# Patient Record
Sex: FEMALE | Race: WHITE | NOT HISPANIC OR LATINO | Employment: FULL TIME | URBAN - METROPOLITAN AREA
[De-identification: names, ages, dates, MRNs, and addresses within clinical notes are randomized per-mention and may not be internally consistent; named-entity substitution may affect disease eponyms.]

---

## 2018-07-02 ENCOUNTER — OFFICE VISIT (OUTPATIENT)
Dept: URGENT CARE | Facility: CLINIC | Age: 63
End: 2018-07-02
Payer: COMMERCIAL

## 2018-07-02 VITALS
TEMPERATURE: 97.8 F | WEIGHT: 263 LBS | OXYGEN SATURATION: 94 % | SYSTOLIC BLOOD PRESSURE: 136 MMHG | RESPIRATION RATE: 18 BRPM | BODY MASS INDEX: 42.27 KG/M2 | HEIGHT: 66 IN | DIASTOLIC BLOOD PRESSURE: 82 MMHG | HEART RATE: 74 BPM

## 2018-07-02 DIAGNOSIS — H00.022 HORDEOLUM INTERNUM OF RIGHT LOWER EYELID: ICD-10-CM

## 2018-07-02 PROCEDURE — 99214 OFFICE O/P EST MOD 30 MIN: CPT | Performed by: NURSE PRACTITIONER

## 2018-07-02 RX ORDER — POLYMYXIN B SULFATE AND TRIMETHOPRIM 1; 10000 MG/ML; [USP'U]/ML
1 SOLUTION OPHTHALMIC EVERY 4 HOURS
Qty: 10 ML | Refills: 0 | Status: SHIPPED | OUTPATIENT
Start: 2018-07-02 | End: 2018-07-09

## 2018-07-02 NOTE — PROGRESS NOTES
Chief Complaint   Patient presents with   • Eye Problem     rt eye crusty and red x 4 days        HISTORY OF PRESENT ILLNESS: Patient is a 62 y.o. female who presents to urgent care today with complaints of pain, swelling, and tenderness to right lower eye lid for the past four days. She admits to associated clear drainage and crusting. Denies fever or chills. She wears glasses, not contacts. Denies associated eye pain, changes in vision, double vision, photophobia, or foreign body sensation. She has not tried any medication for symptom relief. She has tried warm and cold compresses. Denies previous history of the same.       Patient Active Problem List    Diagnosis Date Noted   • BMI 40.0-44.9, adult ([facility]) 2016   • Psoriasis 2016       Allergies:Sulfa drugs    Current Outpatient Prescriptions Ordered in [facility]   Medication Sig Dispense Refill   • clobetasol (TEMOVATE) 0.05 % Cream Apply 1 Application to affected area(s) 2 times a day. 1 Tube 2   • NAPROXEN PO Take  by mouth.       No current Epic-ordered facility-administered medications on file.        Past Medical History:   Diagnosis Date   • Blood transfusion without reported diagnosis     in her tonsilectomy at age 8   • Hypertension     in history, thought stressed realted, hx of white coat syndrome   • IBD (inflammatory bowel disease)     maybe mild IBS occasionally       Social History   Substance Use Topics   • Smoking status: Former Smoker     Packs/day: 0.75     Years: 20.00     Quit date: 3/1/2016   • Smokeless tobacco: Never Used   • Alcohol use 1.8 oz/week     3 Shots of liquor per week      Comment: intermittent       Family Status   Relation Status   • Mother    • Father  at age 69   • Brother Alive   • Maternal Grandmother    • Maternal Grandfather    • Paternal Grandmother    • Paternal Grandfather    • Brother Alive     Family History   Problem Relation Age of Onset   • Dementia Mother   "  • Cancer Father      brain Ca   • Heart Attack Brother 45       ROS:  Review of Systems   Constitutional: Negative for fever, chills, weight loss, malaise, and fatigue.   HENT: Negative for ear pain, nosebleeds, congestion, sore throat and neck pain.    Eyes: Positive for right lower lid pain, swelling, erythema. Negative for eye pain, changes in vision, double vision, photophobia, or foreign body sensation.   Neuro: Negative for headache, sensory changes, weakness, seizure, LOC.   Cardiovascular: Negative for chest pain, palpitations, orthopnea and leg swelling.   Respiratory: Negative for cough, sputum production, shortness of breath and wheezing.   Gastrointestinal: Negative for abdominal pain, nausea, vomiting or diarrhea.   Musculoskeletal: Negative for falls, neck pain, back pain, joint pain, myalgias.   Skin: Negative for rash, diaphoresis.     Exam:  Blood pressure 136/82, pulse 74, temperature 36.6 °C (97.8 °F), resp. rate 18, height 1.676 m (5' 6\"), weight 119.3 kg (263 lb), SpO2 94 %.  General: well-nourished, well-developed female in NAD  Head: normocephalic, atraumatic  Eyes: PERRLA, no conjunctival injection, acuity grossly intact. 1cm area of erythema, swelling, and tenderness to right lower lid. Internal hordeolum noted.   Ears: normal shape and symmetry, gross auditory acuity is intact.  Nose: symmetrical without tenderness, no discharge.  Mouth/Throat: reasonable hygiene, no erythema, exudates or tonsillar enlargement.  Neck: no masses, range of motion within normal limits, no tracheal deviation. No obvious thyroid enlargement.   Lymph: no cervical adenopathy. No supraclavicular adenopathy.   Neuro: alert and oriented. Cranial nerves 1-12 grossly intact. No sensory deficit.   Cardiovascular: regular rate and rhythm. No edema.  Pulmonary: no distress. Chest is symmetrical with respiration, no wheezes, crackles, or rhonchi.   Musculoskeletal: no clubbing, appropriate muscle tone, gait is " stable.  Skin: warm, dry, intact, no clubbing, no cyanosis, no rashes.   Psych: appropriate mood, affect, judgement.         Assessment/Plan:  1. Hordeolum internum of right lower eyelid  polymixin-trimethoprim (POLYTRIM) 54982-1.1 UNIT/ML-% Solution           Warm compresses, OTC NSAIDS. Contingent antibiotic prescription given to patient to fill upon meeting criteria of guidelines discussed.   Supportive care, differential diagnoses, and indications for immediate follow-up discussed with patient.   Pathogenesis of diagnosis discussed including typical length and natural progression.   Instructed to return to clinic or nearest emergency department for any change in condition, further concerns, or worsening of symptoms.  Patient states understanding of the plan of care and discharge instructions.  Instructed to make an appointment, for follow up, with an eye specialist and/or her primary care provider.        Please note that this dictation was created using voice recognition software. I have made every reasonable attempt to correct obvious errors, but I expect that there are errors of grammar and possibly content that I did not discover before finalizing the note.      GILLIAN Loaiza.

## 2020-02-20 ENCOUNTER — OFFICE VISIT (OUTPATIENT)
Dept: URGENT CARE | Facility: CLINIC | Age: 65
End: 2020-02-20
Payer: COMMERCIAL

## 2020-02-20 VITALS
BODY MASS INDEX: 40.35 KG/M2 | RESPIRATION RATE: 16 BRPM | HEART RATE: 72 BPM | OXYGEN SATURATION: 95 % | DIASTOLIC BLOOD PRESSURE: 78 MMHG | WEIGHT: 250 LBS | SYSTOLIC BLOOD PRESSURE: 132 MMHG | TEMPERATURE: 97 F

## 2020-02-20 DIAGNOSIS — K52.9 GASTROENTERITIS: ICD-10-CM

## 2020-02-20 DIAGNOSIS — R11.0 NAUSEA: ICD-10-CM

## 2020-02-20 PROCEDURE — 99214 OFFICE O/P EST MOD 30 MIN: CPT | Performed by: NURSE PRACTITIONER

## 2020-02-20 RX ORDER — ONDANSETRON 4 MG/1
4 TABLET, ORALLY DISINTEGRATING ORAL EVERY 8 HOURS PRN
Qty: 20 TAB | Refills: 0 | Status: SHIPPED | OUTPATIENT
Start: 2020-02-20 | End: 2021-10-08

## 2020-02-20 ASSESSMENT — ENCOUNTER SYMPTOMS
SHORTNESS OF BREATH: 0
HEARTBURN: 0
SPUTUM PRODUCTION: 0
HEMOPTYSIS: 0
DOUBLE VISION: 0
CHILLS: 0
FLANK PAIN: 0
PALPITATIONS: 0
DIARRHEA: 0
WEIGHT LOSS: 0
BLURRED VISION: 0
BLOOD IN STOOL: 0
ABDOMINAL PAIN: 0
CONSTIPATION: 0
FEVER: 0
COUGH: 0

## 2020-02-20 NOTE — PROGRESS NOTES
"Subjective:      Magdalena Ascencio is a 64 y.o. female who presents with Emesis (Pt states she started having vomiting after having a sandwich on Saturday and it went on for 24 hours. Pt states that for the last couple of years she has become lactose intolerant and has been getting a lot of upset stomach and would like to get checked out. Pt also requesting a referral to PCP.)            HPI This is a new problem. Pt came in c/o nausea and vomiting since last Monday. Pt states she vomited x1 on Saturday night after dinner, then Sunday night after a sushi dinner she woke up at midnight with her stomach \"rumbling\" and vomited \"all day\" Sunday, pt states vomiting episodes has decreased but still gets nauseated and vomits after eating. Pt is able to tolerated some fluids. Vomit described as undigested food, denies any blood. Pt expressed for the past few year her stomach is more \"sensitive\" and she can not eat dairies anymore. But did not eat any dairy within the past few days.  Treatment tried: none. No other aggravating or alleviating factors.       Review of Systems   Constitutional: Negative for chills, fever, malaise/fatigue and weight loss.   HENT: Negative for ear discharge, ear pain, hearing loss and tinnitus.    Eyes: Negative for blurred vision and double vision.   Respiratory: Negative for cough, hemoptysis, sputum production and shortness of breath.    Cardiovascular: Negative for chest pain, palpitations and leg swelling.   Gastrointestinal: Negative for abdominal pain, blood in stool, constipation, diarrhea and heartburn.   Genitourinary: Negative for dysuria, flank pain, frequency, hematuria and urgency.   Skin: Negative for rash.     Allergies   Allergen Reactions   • Sulfa Drugs Rash     Minor rash     Reviewed past medical, surgical and family history. Reviewed prescription and OTC medications with patient in electronic health record today    Current Outpatient Medications on File Prior to Visit "   Medication Sig Dispense Refill   • clobetasol (TEMOVATE) 0.05 % Cream Apply 1 Application to affected area(s) 2 times a day. 1 Tube 2   • NAPROXEN PO Take  by mouth.       No current facility-administered medications on file prior to visit.             Objective:     /78 (BP Location: Left arm, Patient Position: Sitting, BP Cuff Size: Large adult)   Pulse 72   Temp 36.1 °C (97 °F) (Temporal)   Resp 16   Wt 113.4 kg (250 lb)   SpO2 95%   BMI 40.35 kg/m²      Physical Exam  Constitutional:       General: She is not in acute distress.     Appearance: Normal appearance. She is not ill-appearing.   HENT:      Head: Normocephalic.      Nose: No congestion.      Mouth/Throat:      Mouth: Mucous membranes are moist.      Pharynx: Oropharynx is clear.   Neck:      Musculoskeletal: Normal range of motion and neck supple. No neck rigidity or muscular tenderness.   Cardiovascular:      Rate and Rhythm: Normal rate and regular rhythm.      Pulses: Normal pulses.      Heart sounds: Normal heart sounds.   Pulmonary:      Breath sounds: Normal breath sounds. No wheezing or rales.   Abdominal:      General: There is no distension.      Palpations: There is no mass.      Tenderness: There is no abdominal tenderness. There is no right CVA tenderness, left CVA tenderness, guarding or rebound.   Musculoskeletal:         General: No swelling or tenderness.   Skin:     General: Skin is warm and dry.      Capillary Refill: Capillary refill takes less than 2 seconds.   Neurological:      General: No focal deficit present.      Mental Status: She is alert and oriented to person, place, and time. Mental status is at baseline.   Psychiatric:         Mood and Affect: Mood normal.         Behavior: Behavior normal.         Thought Content: Thought content normal.         Judgment: Judgment normal.                 Assessment/Plan:       1. Nausea  REFERRAL TO FOLLOW-UP WITH PRIMARY CARE    ondansetron (ZOFRAN ODT) 4 MG TABLET  DISPERSIBLE   2. Gastroenteritis  REFERRAL TO FOLLOW-UP WITH PRIMARY CARE     Keep well hydrated with sips of water throughtout the day-if water is not tolerated by itself try placing fruits and/or mixing the water with some juice for better taste  Referral for PCP appoitnment given  Seek medical attention for worsening of symptoms especially if you are not able to tolerate PO or medication is not relieving the nausea  Educated in BRAT type diet for a few days until symptoms resolve.

## 2020-02-24 ENCOUNTER — OFFICE VISIT (OUTPATIENT)
Dept: MEDICAL GROUP | Facility: MEDICAL CENTER | Age: 65
End: 2020-02-24
Payer: COMMERCIAL

## 2020-02-24 VITALS
BODY MASS INDEX: 41.32 KG/M2 | WEIGHT: 248 LBS | OXYGEN SATURATION: 94 % | HEART RATE: 78 BPM | HEIGHT: 65 IN | SYSTOLIC BLOOD PRESSURE: 150 MMHG | TEMPERATURE: 97.8 F | RESPIRATION RATE: 14 BRPM | DIASTOLIC BLOOD PRESSURE: 82 MMHG

## 2020-02-24 DIAGNOSIS — R11.0 NAUSEA: ICD-10-CM

## 2020-02-24 DIAGNOSIS — F12.90 MARIJUANA USE, CONTINUOUS: ICD-10-CM

## 2020-02-24 PROCEDURE — 99214 OFFICE O/P EST MOD 30 MIN: CPT | Performed by: NURSE PRACTITIONER

## 2020-02-24 RX ORDER — PROMETHAZINE HYDROCHLORIDE 25 MG/1
25 TABLET ORAL EVERY 6 HOURS PRN
Qty: 30 TAB | Refills: 0 | Status: SHIPPED | OUTPATIENT
Start: 2020-02-24 | End: 2021-10-08

## 2020-02-24 ASSESSMENT — PATIENT HEALTH QUESTIONNAIRE - PHQ9: CLINICAL INTERPRETATION OF PHQ2 SCORE: 6

## 2020-02-25 NOTE — PROGRESS NOTES
"CC: Nausea (nausea and vomiting x 1 week. Stomach aches. No diarrhea. No fever. Can keep some food down, such as peanut butter, oatmeal, saltines)        HPI:     Magdalena presents today for the following:  Last seen by me in 2016      1. Nausea  Follow-up from an urgent care visit on 2/20 for symptoms of nausea and vomiting that started 7 days ago.  States she \"cannot eat anything without \"throwing it back up\" states that she can sip on fluids but large amounts of water will cause her to gag and throw up.  No associated fever.  No bloody or coffee-ground emesis no associated black or bloody stools.  No associated abdominal pain or cramping.  No associated lower GI symptoms such as diarrhea constipation black or bloody stools.  Sometimes we have a gurgly \"rolling\" sensation in her stomach.  If significant can precipitate gagging.  States in the last 48 hours she was \"okay\" and she was able to eat yesterday (Sunday) an English muffin with jam for breakfast, saltines and peanut butter for dinner, small piece of ham without any nausea or vomiting.  Today she was able to have oatmeal and crackers.  Had a small piece of somebody's tuna sandwich and a potato chip which caused her to be nauseous and gagged again.  Currently is not throwing up food but throwing up spit.    Does not currently use any prescription medications.  No over-the-counter medications.  She is not drinking alcohol \"last alcohol beverage was at [place]\"    When in urgent care she was diagnosed with gastroenteritis.  She was sent home with Zofran which she states had been helping except for the last 24 hours.  No one else at home with similar symptoms    2. Marijuana use, continuous  Does smoke marijuana almost daily.    Current Outpatient Medications   Medication Sig Dispense Refill   • promethazine (PHENERGAN) 25 MG Tab Take 1 Tab by mouth every 6 hours as needed for Nausea/Vomiting. 30 Tab 0   • ondansetron (ZOFRAN ODT) 4 MG TABLET DISPERSIBLE Take " "1 Tab by mouth every 8 hours as needed. 20 Tab 0   • clobetasol (TEMOVATE) 0.05 % Cream Apply 1 Application to affected area(s) 2 times a day. 1 Tube 2   • NAPROXEN PO Take  by mouth.       No current facility-administered medications for this visit.      Social History     Tobacco Use   • Smoking status: Current Every Day Smoker     Packs/day: 0.75     Years: 20.00     Pack years: 15.00     Types: Cigarettes     Last attempt to quit: 3/1/2016     Years since quitting: 3.9   • Smokeless tobacco: Never Used   Substance Use Topics   • Alcohol use: Not Currently     Alcohol/week: 1.8 oz     Types: 3 Shots of liquor per week     Comment: intermittent   • Drug use: No     I reviewed patients allergies, problem list and medications today in [facility].    ROS: Any/all pertinent positives listed in the HPI, otherwise all others reviewed are negative today.      /82 (BP Location: Left arm, Patient Position: Sitting, BP Cuff Size: Large adult)   Pulse 78   Temp 36.6 °C (97.8 °F) (Temporal)   Resp 14   Ht 1.656 m (5' 5.2\")   Wt 112.5 kg (248 lb)   SpO2 94%   BMI 41.02 kg/m²     Exam:   Gen: Alert and oriented, No apparent distress. WDWN  Psych: A+Ox3, normal affect and mood  Skin: Warm, dry and intact. Good turgor   No rashes in visible areas.  Eye: Conjunctiva clear, lids normal  ENMT: Lips without lesions, good dentition   Oropharynx clear.   Neck: No Lymphadenopathy, Thyromegaly, Bruits.   Trachea midline, no masses  Lungs: Clear to auscultation bilaterally, no rales or rhonchi   Unlabored respiratory effort.   CV: Regular rate and rhythm, S1, S2. No murmurs.   No Edema  Abd: Soft non tender, non distended. Normal active bowel sounds.    No Hepatosplenomegaly, No pulsatile masses.   Patient does have a emesis bag which has only spit inside.  2 pound weight loss since she was seen on 2/20  BP elevated today however normal when seen in urgent care 4 days ago.  Suspect because she was gagging.    Assessment and Plan. "   64 y.o. female with the following issues.    1. Nausea  Unsure of etiology however may be related to her fairly continuous marijuana use.  She is advised to stop this.  Basic labs as below.  Given no fever no abdominal pain and intermittent improvement of symptoms I will not order any imaging currently.  We did reiterate the importance of a bland diet and encouraged her to continue foods like an English muffin, oatmeal, crackers, dry toast, chicken soup type things.  Avoid greasy, acidic foods avoid spicy foods avoid seafood..  Recommend small sips of fluids including water/Gatorade/Pedialyte/7-Up.  May suck on ice cubes to maintain hydrated if needed.  She does have 4 tablets of Zofran left.  I will send Phenergan for her to try to see if it helps as well.  I did mention that Phenergan can cause a little bit of sleepiness.  She will follow-up if the symptoms are not improving over the next several days especially if she stops the marijuana use  - CBC WITH DIFFERENTIAL; Future  - LIPASE; Future  - Comp Metabolic Panel; Future  - CBC WITH DIFFERENTIAL; Future  - promethazine (PHENERGAN) 25 MG Tab; Take 1 Tab by mouth every 6 hours as needed for Nausea/Vomiting.  Dispense: 30 Tab; Refill: 0    2. Marijuana use, continuous  Likely the cause of #1.  Recommend to stop marijuana use completely.  Patient verbalizes understanding      Patient is also due for a routine follow-up.  She will make an appointment in the near future to do so

## 2020-02-26 ENCOUNTER — HOSPITAL ENCOUNTER (OUTPATIENT)
Dept: LAB | Facility: MEDICAL CENTER | Age: 65
End: 2020-02-26
Attending: NURSE PRACTITIONER
Payer: COMMERCIAL

## 2020-02-26 DIAGNOSIS — R11.0 NAUSEA: ICD-10-CM

## 2020-02-26 LAB
ALBUMIN SERPL BCP-MCNC: 4.3 G/DL (ref 3.2–4.9)
ALBUMIN/GLOB SERPL: 1.3 G/DL
ALP SERPL-CCNC: 75 U/L (ref 30–99)
ALT SERPL-CCNC: 34 U/L (ref 2–50)
ANION GAP SERPL CALC-SCNC: 13 MMOL/L (ref 0–11.9)
AST SERPL-CCNC: 36 U/L (ref 12–45)
BASOPHILS # BLD AUTO: 0.2 % (ref 0–1.8)
BASOPHILS # BLD: 0.02 K/UL (ref 0–0.12)
BILIRUB SERPL-MCNC: 1 MG/DL (ref 0.1–1.5)
BUN SERPL-MCNC: 12 MG/DL (ref 8–22)
CALCIUM SERPL-MCNC: 9.2 MG/DL (ref 8.5–10.5)
CHLORIDE SERPL-SCNC: 96 MMOL/L (ref 96–112)
CO2 SERPL-SCNC: 28 MMOL/L (ref 20–33)
CREAT SERPL-MCNC: 0.88 MG/DL (ref 0.5–1.4)
EOSINOPHIL # BLD AUTO: 0.04 K/UL (ref 0–0.51)
EOSINOPHIL NFR BLD: 0.4 % (ref 0–6.9)
ERYTHROCYTE [DISTWIDTH] IN BLOOD BY AUTOMATED COUNT: 42.9 FL (ref 35.9–50)
GLOBULIN SER CALC-MCNC: 3.2 G/DL (ref 1.9–3.5)
GLUCOSE SERPL-MCNC: 114 MG/DL (ref 65–99)
HCT VFR BLD AUTO: 50.2 % (ref 37–47)
HGB BLD-MCNC: 17 G/DL (ref 12–16)
IMM GRANULOCYTES # BLD AUTO: 0.04 K/UL (ref 0–0.11)
IMM GRANULOCYTES NFR BLD AUTO: 0.4 % (ref 0–0.9)
LIPASE SERPL-CCNC: 19 U/L (ref 11–82)
LYMPHOCYTES # BLD AUTO: 1.18 K/UL (ref 1–4.8)
LYMPHOCYTES NFR BLD: 12.2 % (ref 22–41)
MCH RBC QN AUTO: 30.2 PG (ref 27–33)
MCHC RBC AUTO-ENTMCNC: 33.9 G/DL (ref 33.6–35)
MCV RBC AUTO: 89.2 FL (ref 81.4–97.8)
MONOCYTES # BLD AUTO: 0.86 K/UL (ref 0–0.85)
MONOCYTES NFR BLD AUTO: 8.9 % (ref 0–13.4)
NEUTROPHILS # BLD AUTO: 7.57 K/UL (ref 2–7.15)
NEUTROPHILS NFR BLD: 77.9 % (ref 44–72)
NRBC # BLD AUTO: 0 K/UL
NRBC BLD-RTO: 0 /100 WBC
PLATELET # BLD AUTO: 281 K/UL (ref 164–446)
PMV BLD AUTO: 11.5 FL (ref 9–12.9)
POTASSIUM SERPL-SCNC: 3 MMOL/L (ref 3.6–5.5)
PROT SERPL-MCNC: 7.5 G/DL (ref 6–8.2)
RBC # BLD AUTO: 5.63 M/UL (ref 4.2–5.4)
SODIUM SERPL-SCNC: 137 MMOL/L (ref 135–145)
WBC # BLD AUTO: 9.7 K/UL (ref 4.8–10.8)

## 2020-02-26 PROCEDURE — 80053 COMPREHEN METABOLIC PANEL: CPT

## 2020-02-26 PROCEDURE — 36415 COLL VENOUS BLD VENIPUNCTURE: CPT

## 2020-02-26 PROCEDURE — 85025 COMPLETE CBC W/AUTO DIFF WBC: CPT

## 2020-02-26 PROCEDURE — 83690 ASSAY OF LIPASE: CPT

## 2020-02-27 ENCOUNTER — TELEPHONE (OUTPATIENT)
Dept: MEDICAL GROUP | Facility: MEDICAL CENTER | Age: 65
End: 2020-02-27

## 2020-02-27 DIAGNOSIS — F12.90 MARIJUANA USE: ICD-10-CM

## 2020-02-27 DIAGNOSIS — E87.6 HYPOKALEMIA: ICD-10-CM

## 2020-02-27 RX ORDER — POTASSIUM CHLORIDE 20 MEQ/1
20 TABLET, EXTENDED RELEASE ORAL DAILY
Qty: 7 TAB | Refills: 0 | Status: SHIPPED | OUTPATIENT
Start: 2020-02-27 | End: 2020-03-05

## 2020-02-27 NOTE — TELEPHONE ENCOUNTER
Please call Magdalena.  Her potassium is quite low, possibly from the frequent vomiting.    I will send a few days of oral pills to replace her potassium and we should recheck again in 1-2 weeks    rx sent  Bmp nonfasting is ordered    Rest of her labs looked ok.

## 2020-02-27 NOTE — TELEPHONE ENCOUNTER
Patient notified. She was reading the results letter while I was on the phone with her. She didn't vomit yesterday and today hasn't so far. She will  rx and repeat lab.

## 2021-03-15 DIAGNOSIS — Z23 NEED FOR VACCINATION: ICD-10-CM

## 2021-10-08 ENCOUNTER — OFFICE VISIT (OUTPATIENT)
Dept: MEDICAL GROUP | Facility: IMAGING CENTER | Age: 66
End: 2021-10-08
Payer: COMMERCIAL

## 2021-10-08 VITALS
TEMPERATURE: 97.3 F | BODY MASS INDEX: 41.78 KG/M2 | RESPIRATION RATE: 12 BRPM | DIASTOLIC BLOOD PRESSURE: 84 MMHG | WEIGHT: 260 LBS | HEIGHT: 66 IN | HEART RATE: 72 BPM | OXYGEN SATURATION: 94 % | SYSTOLIC BLOOD PRESSURE: 142 MMHG

## 2021-10-08 DIAGNOSIS — R35.1 NOCTURIA: ICD-10-CM

## 2021-10-08 DIAGNOSIS — Z13.1 SCREENING FOR DIABETES MELLITUS: ICD-10-CM

## 2021-10-08 DIAGNOSIS — Z13.29 SCREENING FOR ENDOCRINE DISORDER: ICD-10-CM

## 2021-10-08 DIAGNOSIS — N64.59 SIGN AND SYMPTOM IN BREAST: ICD-10-CM

## 2021-10-08 DIAGNOSIS — F17.200 SMOKING: ICD-10-CM

## 2021-10-08 DIAGNOSIS — G47.9 DIFFICULTY SLEEPING: ICD-10-CM

## 2021-10-08 DIAGNOSIS — F33.1 MODERATE EPISODE OF RECURRENT MAJOR DEPRESSIVE DISORDER (HCC): ICD-10-CM

## 2021-10-08 DIAGNOSIS — D75.1 POLYCYTHEMIA: ICD-10-CM

## 2021-10-08 DIAGNOSIS — Z23 NEED FOR INFLUENZA VACCINATION: ICD-10-CM

## 2021-10-08 DIAGNOSIS — Z13.220 SCREENING, LIPID: ICD-10-CM

## 2021-10-08 DIAGNOSIS — Z56.6 WORK STRESS: ICD-10-CM

## 2021-10-08 DIAGNOSIS — Z11.59 NEED FOR HEPATITIS C SCREENING TEST: ICD-10-CM

## 2021-10-08 DIAGNOSIS — L30.4 INTERTRIGO: ICD-10-CM

## 2021-10-08 DIAGNOSIS — Z13.0 SCREENING FOR DEFICIENCY ANEMIA: ICD-10-CM

## 2021-10-08 DIAGNOSIS — Z13.21 ENCOUNTER FOR VITAMIN DEFICIENCY SCREENING: ICD-10-CM

## 2021-10-08 PROCEDURE — 90471 IMMUNIZATION ADMIN: CPT | Performed by: FAMILY MEDICINE

## 2021-10-08 PROCEDURE — 90686 IIV4 VACC NO PRSV 0.5 ML IM: CPT | Performed by: FAMILY MEDICINE

## 2021-10-08 PROCEDURE — 99215 OFFICE O/P EST HI 40 MIN: CPT | Mod: 25 | Performed by: FAMILY MEDICINE

## 2021-10-08 RX ORDER — NYSTATIN 100000 [USP'U]/G
1 POWDER TOPICAL 3 TIMES DAILY
Qty: 30 G | Refills: 0 | Status: SHIPPED | OUTPATIENT
Start: 2021-10-08

## 2021-10-08 RX ORDER — SERTRALINE HYDROCHLORIDE 25 MG/1
25 TABLET, FILM COATED ORAL DAILY
Qty: 30 TABLET | Refills: 11 | Status: ON HOLD
Start: 2021-10-08 | End: 2021-10-28

## 2021-10-08 SDOH — HEALTH STABILITY - MENTAL HEALTH: OTHER PHYSICAL AND MENTAL STRAIN RELATED TO WORK: Z56.6

## 2021-10-08 ASSESSMENT — PATIENT HEALTH QUESTIONNAIRE - PHQ9
SUM OF ALL RESPONSES TO PHQ QUESTIONS 1-9: 10
CLINICAL INTERPRETATION OF PHQ2 SCORE: 3
5. POOR APPETITE OR OVEREATING: 0 - NOT AT ALL

## 2021-10-08 ASSESSMENT — PAIN SCALES - GENERAL: PAINLEVEL: NO PAIN

## 2021-10-08 ASSESSMENT — FIBROSIS 4 INDEX: FIB4 SCORE: 1.41

## 2021-10-08 NOTE — PROGRESS NOTES
Chief Complaint   Patient presents with   • Breast Mass     bilateral, pain/bruising on left    • Fatigue     chronic      HPI:  66 y.o. female new patient here to review medical issues and establish care.   Previously with [facility].     Breast issues-can occur bilaterally.  May notice intermittent symptoms, might notice some itching or something of the skin area.  Never really hurts. Feels deeper cysts in the area that burst, looks bruised.   Will weep outward. Clear or white sometimes. Sometimes has odor.   2 months last time on right side. No nodules.     Polycythemia on prior labs.  Patient is uncertain about snoring.  Occasionally wakes herself up.  States she does not sleep well.  Has woken up to urinate for years.  No dysuria or hematuria.  Patient does continue to smoke currently.    Body mass index is 41.97 kg/m².  No family hx of diabetes.    Polycythemia- uncertain about snoring.   Occasionally wakes self up. Does not sleep well. Wakes up to urinate for years.   No blood or pain with urination.     Feels depressed. Has work stress. May have some headaches.   Did not do well with wellbutrin. Was on lexapro in past.  Depressive symptoms after mother passed away many years ago. Past 3 years have been more difficult due work issues and issues with boss.   No SI/HI.     Lifestyle:  Diet: not healthy, feels tired  Exercise/Activities: limited  Stressors: work  Social Support: brother in area  Work: MFT, youth, substance abuse counseling    Health Maintenance:    Health Maintenance Summary                COLORECTAL CANCER SCREENING Overdue 1957     HEPATITIS C SCREENING Overdue 1957     IMM PNEUMOCOCCAL VACCINE: 0-64 Years Overdue 8/11/1963     IMM DTaP/Tdap/Td Vaccine Overdue 8/11/1976     MAMMOGRAM Overdue 1/1/2007      Done 1/1/2006 NL    IMM ZOSTER VACCINES Overdue 8/11/2007     PAP SMEAR Overdue 1/1/2009      Done 1/1/2006 NL          Immunization History   Administered Date(s)  Administered   • Hepatitis A Vaccine, Adult 02/04/1999, 07/08/1999   • Hepatitis B Vaccine (Adol/Adult) 02/04/1999, 03/04/1999, 07/08/1999   • Influenza (IM) Preservative Free - HISTORICAL DATA 10/22/2010, 11/10/2011, 12/03/2012, 12/06/2013   • Influenza Seasonal Injectable - Historical Data 11/05/2015   • Moderna SARS-CoV-2 Vaccine 02/04/2021, 03/04/2021         Review of Systems   Constitutional: Negative for fever, chills and malaise/fatigue.   HENT: Negative for congestion, sore throat, or swallowing issues.   Eyes: Negative for pain or vision changes.   Respiratory: Negative for cough and shortness of breath.    Cardiovascular: Negative for leg swelling. No chest pain.   Gastrointestinal: Negative for nausea, vomiting, abdominal pain and diarrhea.   Genitourinary: Negative for dysuria and hematuria.   Skin: Negative for rash.   Neurological: Negative for dizziness, focal weakness and headaches.   Endo/Heme/Allergies: Does not bruise/bleed easily.   Psychiatric/Behavioral: Negative for depression.  The patient is not nervous/anxious.          Current Outpatient Medications:   •  nystatin (MYCOSTATIN) powder, Apply 1 g topically 3 times a day., Disp: 30 g, Rfl: 0  •  sertraline (ZOLOFT) 25 MG tablet, Take 1 Tablet by mouth every day., Disp: 30 Tablet, Rfl: 11  •  NAPROXEN PO, Take  by mouth., Disp: , Rfl:     Allergies   Allergen Reactions   • Sulfa Drugs Rash     Minor rash       Patient Active Problem List   Diagnosis   • BMI 40.0-44.9, adult (HCC)   • Psoriasis   • Marijuana use       Past Medical History:   Diagnosis Date   • Blood transfusion without reported diagnosis     in her tonsilectomy at age 8   • Hypertension     in history, thought stressed realted, hx of white coat syndrome   • IBD (inflammatory bowel disease)     maybe mild IBS occasionally   • Shingles        Past Surgical History:   Procedure Laterality Date   • DILATION AND CURETTAGE  20s   • TONSILLECTOMY  8 yoa       Family History   Problem  "Relation Age of Onset   • Dementia Mother    • Cancer Father         brain Ca   • Heart Attack Brother 45       Social History     Tobacco Use   • Smoking status: Current Every Day Smoker     Packs/day: 1.00     Years: 20.00     Pack years: 20.00     Types: Cigarettes     Last attempt to quit: 3/1/2016     Years since quittin.6   • Smokeless tobacco: Never Used   Vaping Use   • Vaping Use: Never used   Substance Use Topics   • Alcohol use: Yes     Alcohol/week: 1.8 oz     Types: 3 Standard drinks or equivalent per week     Comment: intermittent   • Drug use: Yes     Frequency: 4.0 times per week     Types: Marijuana, Inhaled         PHYSICAL EXAM:  /84   Pulse 72   Temp 36.3 °C (97.3 °F)   Resp 12   Ht 1.676 m (5' 6\")   Wt 118 kg (260 lb)   SpO2 94%   BMI 41.97 kg/m²   GEN: Well-developed well-nourished female.  Patient tearful during visit.  PSYCH: sad affect  HEAD AND NECK:  Ears normal.  Throat, oral cavity and tongue normal.  Neck supple. No adenopathy or masses in the neck or   supraclavicular regions.  No carotid bruits. No thyromegaly.   NEURO: Cranial nerves are normal. Neck supple. DTR's normal and symmetric.    CHEST:  Clear, good air entry, no wheezes, rhonci or rales.   HEART:  S1 and S2 normal, no murmurs, clicks, gallops or rubs.  Regular rate and rhythm.  No edema or JVD.   ABDOMEN:  Soft without tenderness, guarding, mass or organomegaly. No CVA tenderness or inguinal adenopathy.   EXTREMITIES:  Extremities, reflexes and peripheral pulses are normal.    SKIN:  No rashes or suspicious skin lesions noted.   BREAST EXAM: inferior breast region with slight erythema and moisture, also with skin areas of healing and slight erythema.  No nipple discharge or bleeding. No masses or nodularity palpable. No axillary ANUJA.       ASSESSMENT/PLAN:    This is a 66 y.o. female new patient.     1. Sign and symptom in breast  MA-DIAGNOSTIC MAMMO BILAT W/TOMOSYNTHESIS W/CAD    US-BREAST BILAT-COMPLETE "   2. Intertrigo  nystatin (MYCOSTATIN) powder   3. Polycythemia     4. Difficulty sleeping     5. Nocturia  URINALYSIS,CULTURE IF INDICATED   6. Smoking     7. BMI 40.0-44.9, adult (HCC)     8. Moderate episode of recurrent major depressive disorder (HCC)  sertraline (ZOLOFT) 25 MG tablet   9. Work stress     10. Screening, lipid  Lipid Profile   11. Screening for diabetes mellitus  Comp Metabolic Panel    HEMOGLOBIN A1C   12. Screening for deficiency anemia  CBC WITH DIFFERENTIAL   13. Encounter for vitamin deficiency screening  VITAMIN D,25 HYDROXY   14. Screening for endocrine disorder  TSH WITH REFLEX TO FT4   15. Need for hepatitis C screening test  HEP C VIRUS ANTIBODY   16. Need for influenza vaccination  INFLUENZA VACCINE QUAD INJ (PF)   -Breast symptoms-component is associated with intertrigo and possible cyst with intermittent infection.  Recommend further evaluation with imaging for evaluation at this time.  -Intertrigo-under breast region bilaterally.  Recommend nystatin powder.  Keep area dry.  Monitor.  -Polycythemia-patient does smoke.  Assess overnight oximetry.  -Difficulty sleeping-assess overnight oximetry.  -Nocturia-assess UA. Assess overnight oximetry.   -Smoking-recommend working on smoking cessation in future.  -Patient's body mass index is 41.97 kg/m². Exercise and nutrition counseling were performed at this visit.  -MDD-moderate  Recommend start sertraline 25 mg daily.  Reviewed potential side effects of medication.  Recommend establishing with counseling therapy.  Counseled on management of symptoms and self-care recommendations.  Recommend working on self care with adequate nutrition and fluid intake, routine exercise, adequate sleep and stress management.   -Work stress- as above.    Return in about 2 weeks (around 10/22/2021).    45 minutes spent for visit reviewing past records/labs and for counseling and coordination of care as above.       This medical record contains text that has  been entered with the assistance of computer voice recognition and dictation software.  Therefore, it may contain unintended errors in text, spelling, punctuation, or grammar.

## 2021-10-23 ENCOUNTER — HOSPITAL ENCOUNTER (EMERGENCY)
Facility: MEDICAL CENTER | Age: 66
End: 2021-10-23
Attending: EMERGENCY MEDICINE
Payer: COMMERCIAL

## 2021-10-23 ENCOUNTER — APPOINTMENT (OUTPATIENT)
Dept: RADIOLOGY | Facility: MEDICAL CENTER | Age: 66
End: 2021-10-23
Attending: EMERGENCY MEDICINE
Payer: COMMERCIAL

## 2021-10-23 ENCOUNTER — APPOINTMENT (OUTPATIENT)
Dept: RADIOLOGY | Facility: MEDICAL CENTER | Age: 66
End: 2021-10-23
Attending: ORTHOPAEDIC SURGERY
Payer: COMMERCIAL

## 2021-10-23 VITALS
SYSTOLIC BLOOD PRESSURE: 152 MMHG | OXYGEN SATURATION: 94 % | TEMPERATURE: 98.3 F | WEIGHT: 261.47 LBS | BODY MASS INDEX: 42.02 KG/M2 | HEIGHT: 66 IN | HEART RATE: 94 BPM | RESPIRATION RATE: 18 BRPM | DIASTOLIC BLOOD PRESSURE: 100 MMHG

## 2021-10-23 DIAGNOSIS — S42.022A CLOSED DISPLACED FRACTURE OF SHAFT OF LEFT CLAVICLE, INITIAL ENCOUNTER: ICD-10-CM

## 2021-10-23 DIAGNOSIS — S42.212A CLOSED DISPLACED FRACTURE OF SURGICAL NECK OF LEFT HUMERUS, UNSPECIFIED FRACTURE MORPHOLOGY, INITIAL ENCOUNTER: ICD-10-CM

## 2021-10-23 PROCEDURE — 73200 CT UPPER EXTREMITY W/O DYE: CPT | Mod: LT

## 2021-10-23 PROCEDURE — 700102 HCHG RX REV CODE 250 W/ 637 OVERRIDE(OP): Performed by: EMERGENCY MEDICINE

## 2021-10-23 PROCEDURE — 73000 X-RAY EXAM OF COLLAR BONE: CPT | Mod: LT

## 2021-10-23 PROCEDURE — 73030 X-RAY EXAM OF SHOULDER: CPT | Mod: LT

## 2021-10-23 PROCEDURE — A9270 NON-COVERED ITEM OR SERVICE: HCPCS | Performed by: EMERGENCY MEDICINE

## 2021-10-23 PROCEDURE — 73060 X-RAY EXAM OF HUMERUS: CPT | Mod: LT

## 2021-10-23 PROCEDURE — 99284 EMERGENCY DEPT VISIT MOD MDM: CPT

## 2021-10-23 RX ORDER — HYDROCODONE BITARTRATE AND ACETAMINOPHEN 5; 325 MG/1; MG/1
1 TABLET ORAL EVERY 4 HOURS PRN
Qty: 12 TABLET | Refills: 0 | Status: SHIPPED | OUTPATIENT
Start: 2021-10-23 | End: 2021-10-23 | Stop reason: SDUPTHER

## 2021-10-23 RX ORDER — HYDROCODONE BITARTRATE AND ACETAMINOPHEN 5; 325 MG/1; MG/1
1 TABLET ORAL EVERY 4 HOURS PRN
Qty: 12 TABLET | Refills: 0 | Status: SHIPPED | OUTPATIENT
Start: 2021-10-23 | End: 2021-10-26

## 2021-10-23 RX ORDER — HYDROCODONE BITARTRATE AND ACETAMINOPHEN 5; 325 MG/1; MG/1
1 TABLET ORAL ONCE
Status: COMPLETED | OUTPATIENT
Start: 2021-10-23 | End: 2021-10-23

## 2021-10-23 RX ADMIN — HYDROCODONE BITARTRATE AND ACETAMINOPHEN 1 TABLET: 5; 325 TABLET ORAL at 15:05

## 2021-10-23 ASSESSMENT — FIBROSIS 4 INDEX: FIB4 SCORE: 1.41

## 2021-10-23 ASSESSMENT — PAIN DESCRIPTION - PAIN TYPE: TYPE: ACUTE PAIN

## 2021-10-23 NOTE — ED TRIAGE NOTES
Chief Complaint   Patient presents with   • GLF   • Shoulder Pain     Pt had mechanical fall last night onto concrete now c/o L shoulder pain 8/10, +pulses, limited ROM. Pt tachycardic in triage

## 2021-10-23 NOTE — ED PROVIDER NOTES
"ED Provider Note    CHIEF COMPLAINT  Chief Complaint   Patient presents with   • GLF   • Shoulder Pain       HPI  Magdalena Ascencio is a 66 y.o. female who presents for evaluation of left shoulder pain status post ground-level fall last night.  No weakness or numbness.  Pain is worse with any sort of movement of left shoulder.  Also feels though it extends up into the left side of her neck.  No midline neck or back pain.  No chest or abdominal pain, no head injury.  No other complaints    REVIEW OF SYSTEMS  Negative for fever, weakness, numbness    PAST MEDICAL HISTORY   has a past medical history of Blood transfusion without reported diagnosis, Hypertension, IBD (inflammatory bowel disease), and Shingles.    SOCIAL HISTORY  Social History     Tobacco Use   • Smoking status: Current Every Day Smoker     Packs/day: 1.00     Years: 20.00     Pack years: 20.00     Types: Cigarettes     Last attempt to quit: 3/1/2016     Years since quittin.6   • Smokeless tobacco: Never Used   Vaping Use   • Vaping Use: Never used   Substance and Sexual Activity   • Alcohol use: Yes     Alcohol/week: 1.8 oz     Types: 3 Standard drinks or equivalent per week     Comment: weekly    • Drug use: Yes     Frequency: 4.0 times per week     Types: Marijuana, Inhaled     Comment: marijuana    • Sexual activity: Not Currently       SURGICAL HISTORY   has a past surgical history that includes tonsillectomy (8 yoa) and dilation and curettage (22s).    CURRENT MEDICATIONS  I personally reviewed the medication list in the charting documentation.     ALLERGIES  Allergies   Allergen Reactions   • Sulfa Drugs Rash     Minor rash       PHYSICAL EXAM  VITAL SIGNS: /97   Pulse 100   Temp 35.9 °C (96.7 °F) (Temporal)   Resp 18   Ht 1.676 m (5' 6\")   Wt 119 kg (261 lb 7.5 oz)   SpO2 94%   BMI 42.20 kg/m²   Constitutional: Well appearing patient in no acute distress.  Awake and alert, not toxic nor ill in appearance.  HENT: Normocephalic, " no obvious evidence of acute trauma.   Neck: Comfortable movement without any obvious restriction in the range of motion.  Eyes: Conjunctiva normal, Non-icteric.   Chest: Normal nonlabored respirations.  Skin: The exposed portions of skin reveal no obvious rash or other abnormalities.  Musculoskeletal: Limited range of motion of the left shoulder secondary to pain.  Fullness about the left side of the neck.  Distally neurovascular intact with a normal radial pulse, normal sensation.  Neurologic: Alert, No obvious focal deficits noted.   Psychiatric: Affect normal for clinical presentation    DIAGNOSTIC STUDIES / PROCEDURES    RADIOLOGY  DX-HUMERUS 2+ LEFT   Final Result         Acute comminuted and displaced fracture of the left humeral head and neck.      Acute fracture of the mid left clavicle with displacement by more than one shaft width.      DX-SHOULDER 2+ LEFT   Final Result      1.  Comminuted displaced fracture of the proximal left humerus.            COURSE & MEDICAL DECISION MAKING  Pertinent Labs & Imaging studies reviewed. (See chart for details)    Encounter Summary: This is a very pleasant 66 y.o. female who unfortunately required evaluation in the emergency department today with left shoulder pain status post ground-level fall last night, neurovascularly intact, x-ray reveals a comminuted displaced fracture left humeral head as well as a fracture of the shaft of the clavicle with full displacement.  We will consult orthopedic surgery.  She will be placed in a sling for now ------- I discussed the case with Dr. Thomas, orthopedic surgery.  He requested a CT scan which will be helpful for preoperative planning.  The patient's pain is actually quite well controlled, she will be treated with Norco and a prescription for this and will follow up early next week with the orthopedic surgery team for further evaluation care.  Patient will be discharged immediately after the CT scan of her shoulder is  obtained.    In prescribing controlled substances to this patient, I certify that I have obtained and reviewed the medical history of Magdalena Ascencio. I have also made a good julio effort to obtain applicable records from other providers who have treated the patient.    I have conducted a physical exam and documented it. I have reviewed Ms. Ascencio’s prescription history as maintained by the [place] Prescription Monitoring Program.     I have assessed the patient’s risk for abuse, dependency, and addiction using the validated Opioid Risk Tool    Given the above, I believe the benefits of controlled substance therapy outweigh the risks. The reasons for prescribing controlled substances include my professional opinion that controlled substances are a reasonable choice for this patient in the event other methods are ineffective inadequately controlling pain. Accordingly, I have discussed the risk and benefits, treatment plan, and alternative therapies with the patient.             DISPOSITION: Discharge Home      FINAL IMPRESSION  1. Closed displaced fracture of surgical neck of left humerus, unspecified fracture morphology, initial encounter    2. Closed displaced fracture of shaft of left clavicle, initial encounter        This dictation was created using voice recognition software. The accuracy of the dictation is limited to the abilities of the software. I expect there may be some errors of grammar and possibly content. The nursing notes were reviewed and certain aspects of this information were incorporated into this note.    Electronically signed by: Tyree Baird M.D., 10/23/2021 2:30 PM

## 2021-10-24 NOTE — ED NOTES
PT ambulated with steady gait out of ER.    All lines and monitors DC'd.  Discharge instructions given, questions answered.  Ambulated out of ER, escorted by RN.  Pt states all belongings in possession.  Instructed not to drive after pain medication and pt verbalizes understanding.  RX x1 given.

## 2021-10-27 ENCOUNTER — PRE-ADMISSION TESTING (OUTPATIENT)
Dept: ADMISSIONS | Facility: MEDICAL CENTER | Age: 66
End: 2021-10-27
Attending: ORTHOPAEDIC SURGERY
Payer: COMMERCIAL

## 2021-10-27 RX ORDER — HYDROCODONE BITARTRATE AND ACETAMINOPHEN 5; 325 MG/1; MG/1
1 TABLET ORAL EVERY 4 HOURS PRN
Status: ON HOLD | COMMUNITY
End: 2021-10-28

## 2021-10-28 ENCOUNTER — ANESTHESIA EVENT (OUTPATIENT)
Dept: SURGERY | Facility: MEDICAL CENTER | Age: 66
End: 2021-10-28
Payer: COMMERCIAL

## 2021-10-28 ENCOUNTER — ANESTHESIA (OUTPATIENT)
Dept: SURGERY | Facility: MEDICAL CENTER | Age: 66
End: 2021-10-28
Payer: COMMERCIAL

## 2021-10-28 ENCOUNTER — APPOINTMENT (OUTPATIENT)
Dept: RADIOLOGY | Facility: MEDICAL CENTER | Age: 66
End: 2021-10-28
Attending: ORTHOPAEDIC SURGERY
Payer: COMMERCIAL

## 2021-10-28 ENCOUNTER — HOSPITAL ENCOUNTER (OUTPATIENT)
Facility: MEDICAL CENTER | Age: 66
End: 2021-10-28
Attending: ORTHOPAEDIC SURGERY | Admitting: ORTHOPAEDIC SURGERY
Payer: COMMERCIAL

## 2021-10-28 VITALS
HEART RATE: 69 BPM | DIASTOLIC BLOOD PRESSURE: 75 MMHG | TEMPERATURE: 97.1 F | OXYGEN SATURATION: 95 % | SYSTOLIC BLOOD PRESSURE: 131 MMHG | RESPIRATION RATE: 17 BRPM | WEIGHT: 260.8 LBS | HEIGHT: 66 IN | BODY MASS INDEX: 41.91 KG/M2

## 2021-10-28 DIAGNOSIS — G89.18 ACUTE POSTOPERATIVE PAIN OF EXTREMITY: ICD-10-CM

## 2021-10-28 LAB
ANION GAP SERPL CALC-SCNC: 12 MMOL/L (ref 7–16)
BUN SERPL-MCNC: 17 MG/DL (ref 8–22)
CALCIUM SERPL-MCNC: 9.1 MG/DL (ref 8.5–10.5)
CHLORIDE SERPL-SCNC: 105 MMOL/L (ref 96–112)
CO2 SERPL-SCNC: 22 MMOL/L (ref 20–33)
CREAT SERPL-MCNC: 0.59 MG/DL (ref 0.5–1.4)
ERYTHROCYTE [DISTWIDTH] IN BLOOD BY AUTOMATED COUNT: 47 FL (ref 35.9–50)
EXTERNAL QUALITY CONTROL: NORMAL
GLUCOSE SERPL-MCNC: 122 MG/DL (ref 65–99)
HCT VFR BLD AUTO: 40.1 % (ref 37–47)
HGB BLD-MCNC: 13.4 G/DL (ref 12–16)
MCH RBC QN AUTO: 30.2 PG (ref 27–33)
MCHC RBC AUTO-ENTMCNC: 33.4 G/DL (ref 33.6–35)
MCV RBC AUTO: 90.5 FL (ref 81.4–97.8)
PLATELET # BLD AUTO: 318 K/UL (ref 164–446)
PMV BLD AUTO: 10.2 FL (ref 9–12.9)
POTASSIUM SERPL-SCNC: 3.8 MMOL/L (ref 3.6–5.5)
RBC # BLD AUTO: 4.43 M/UL (ref 4.2–5.4)
SARS-COV+SARS-COV-2 AG RESP QL IA.RAPID: NEGATIVE
SODIUM SERPL-SCNC: 139 MMOL/L (ref 135–145)
WBC # BLD AUTO: 11.9 K/UL (ref 4.8–10.8)

## 2021-10-28 PROCEDURE — A9270 NON-COVERED ITEM OR SERVICE: HCPCS | Performed by: STUDENT IN AN ORGANIZED HEALTH CARE EDUCATION/TRAINING PROGRAM

## 2021-10-28 PROCEDURE — 73060 X-RAY EXAM OF HUMERUS: CPT | Mod: LT

## 2021-10-28 PROCEDURE — 64415 NJX AA&/STRD BRCH PLXS IMG: CPT | Performed by: ORTHOPAEDIC SURGERY

## 2021-10-28 PROCEDURE — A6454 SELF-ADHER BAND W>=3" <5"/YD: HCPCS | Performed by: ORTHOPAEDIC SURGERY

## 2021-10-28 PROCEDURE — C1713 ANCHOR/SCREW BN/BN,TIS/BN: HCPCS | Performed by: ORTHOPAEDIC SURGERY

## 2021-10-28 PROCEDURE — 500891 HCHG PACK, ORTHO MAJOR: Performed by: ORTHOPAEDIC SURGERY

## 2021-10-28 PROCEDURE — 160048 HCHG OR STATISTICAL LEVEL 1-5: Performed by: ORTHOPAEDIC SURGERY

## 2021-10-28 PROCEDURE — 160002 HCHG RECOVERY MINUTES (STAT): Performed by: ORTHOPAEDIC SURGERY

## 2021-10-28 PROCEDURE — 160035 HCHG PACU - 1ST 60 MINS PHASE I: Performed by: ORTHOPAEDIC SURGERY

## 2021-10-28 PROCEDURE — 700101 HCHG RX REV CODE 250: Performed by: STUDENT IN AN ORGANIZED HEALTH CARE EDUCATION/TRAINING PROGRAM

## 2021-10-28 PROCEDURE — 87426 SARSCOV CORONAVIRUS AG IA: CPT | Performed by: ORTHOPAEDIC SURGERY

## 2021-10-28 PROCEDURE — 700105 HCHG RX REV CODE 258: Performed by: ORTHOPAEDIC SURGERY

## 2021-10-28 PROCEDURE — 160029 HCHG SURGERY MINUTES - 1ST 30 MINS LEVEL 4: Performed by: ORTHOPAEDIC SURGERY

## 2021-10-28 PROCEDURE — 700105 HCHG RX REV CODE 258: Performed by: STUDENT IN AN ORGANIZED HEALTH CARE EDUCATION/TRAINING PROGRAM

## 2021-10-28 PROCEDURE — 700111 HCHG RX REV CODE 636 W/ 250 OVERRIDE (IP): Performed by: STUDENT IN AN ORGANIZED HEALTH CARE EDUCATION/TRAINING PROGRAM

## 2021-10-28 PROCEDURE — 85027 COMPLETE CBC AUTOMATED: CPT

## 2021-10-28 PROCEDURE — 160025 RECOVERY II MINUTES (STATS): Performed by: ORTHOPAEDIC SURGERY

## 2021-10-28 PROCEDURE — 160046 HCHG PACU - 1ST 60 MINS PHASE II: Performed by: ORTHOPAEDIC SURGERY

## 2021-10-28 PROCEDURE — 700102 HCHG RX REV CODE 250 W/ 637 OVERRIDE(OP): Performed by: STUDENT IN AN ORGANIZED HEALTH CARE EDUCATION/TRAINING PROGRAM

## 2021-10-28 PROCEDURE — 160041 HCHG SURGERY MINUTES - EA ADDL 1 MIN LEVEL 4: Performed by: ORTHOPAEDIC SURGERY

## 2021-10-28 PROCEDURE — 160009 HCHG ANES TIME/MIN: Performed by: ORTHOPAEDIC SURGERY

## 2021-10-28 PROCEDURE — A4565 SLINGS: HCPCS | Performed by: ORTHOPAEDIC SURGERY

## 2021-10-28 PROCEDURE — 500562 HCHG FIBERWIRE: Performed by: ORTHOPAEDIC SURGERY

## 2021-10-28 PROCEDURE — 73000 X-RAY EXAM OF COLLAR BONE: CPT | Mod: LT

## 2021-10-28 PROCEDURE — 700111 HCHG RX REV CODE 636 W/ 250 OVERRIDE (IP)

## 2021-10-28 PROCEDURE — 501838 HCHG SUTURE GENERAL: Performed by: ORTHOPAEDIC SURGERY

## 2021-10-28 PROCEDURE — 80048 BASIC METABOLIC PNL TOTAL CA: CPT

## 2021-10-28 PROCEDURE — 160036 HCHG PACU - EA ADDL 30 MINS PHASE I: Performed by: ORTHOPAEDIC SURGERY

## 2021-10-28 DEVICE — SCREW LOCK 3.5X40MM ST T15 - (4SFLX8+LPPELVX4=36): Type: IMPLANTABLE DEVICE | Site: HUMERUS | Status: FUNCTIONAL

## 2021-10-28 DEVICE — SCREW LOCK 3.5X45MM ST T15 - (4SFLX8+LPPELVX2=34): Type: IMPLANTABLE DEVICE | Site: HUMERUS | Status: FUNCTIONAL

## 2021-10-28 DEVICE — SCREW LOCK 3.5X14MM ST T15 - (4SFLX6+LPPELVX4=28): Type: IMPLANTABLE DEVICE | Site: CLAVICLE | Status: FUNCTIONAL

## 2021-10-28 DEVICE — SCREW CORT 3.5X16MM ST HEX (4TX6+1TX4+1TX3=31)(SDS=22): Type: IMPLANTABLE DEVICE | Site: CLAVICLE | Status: FUNCTIONAL

## 2021-10-28 DEVICE — SCREW CORT 3.5X28MM ST HEX (4TX6+1TX4+1TX3=31)(SDS=22): Type: IMPLANTABLE DEVICE | Site: HUMERUS | Status: FUNCTIONAL

## 2021-10-28 DEVICE — SCREW CORT 3.5X26MM ST HEX (4TX6+1TX4+1TX3=31)(SDS=22): Type: IMPLANTABLE DEVICE | Site: HUMERUS | Status: FUNCTIONAL

## 2021-10-28 DEVICE — SCREW LOCK 3.5X16MM ST T15 - (4SFLX6+LPPELVX4=28): Type: IMPLANTABLE DEVICE | Site: CLAVICLE | Status: FUNCTIONAL

## 2021-10-28 DEVICE — SCREW LOCK 3.5X26MM ST T15 - (4SFLX6+LPPELVX4=28): Type: IMPLANTABLE DEVICE | Site: HUMERUS | Status: FUNCTIONAL

## 2021-10-28 DEVICE — SCREW CORT 3.5X32MM ST HEX (4TX6+1TX4+1TX3=31)(SDS=22): Type: IMPLANTABLE DEVICE | Site: HUMERUS | Status: FUNCTIONAL

## 2021-10-28 DEVICE — SCREW LOCK 3.5X44MM ST - (4SFLX8=32) W/STARDRIVE: Type: IMPLANTABLE DEVICE | Site: HUMERUS | Status: FUNCTIONAL

## 2021-10-28 DEVICE — SCREW CORT 3.5X22MM ST HEX (4TX6+1TX4+1TX3=31)(SDS=22): Type: IMPLANTABLE DEVICE | Site: CLAVICLE | Status: FUNCTIONAL

## 2021-10-28 DEVICE — PLATE 3.5 LCK SUP CLAV 7-H LT - (CLAVX1=1) 100MM: Type: IMPLANTABLE DEVICE | Site: CLAVICLE | Status: FUNCTIONAL

## 2021-10-28 DEVICE — PLATE LCP 3.5 PROX HUM 5-H - (4SFLX1=4): Type: IMPLANTABLE DEVICE | Site: HUMERUS | Status: FUNCTIONAL

## 2021-10-28 DEVICE — SCREW LOCK 3.5X42MM ST - (4SFLX8=32) W/STARDRIVE: Type: IMPLANTABLE DEVICE | Site: HUMERUS | Status: FUNCTIONAL

## 2021-10-28 DEVICE — SCREW LOCK 3.5X36MM ST T15 - (4SFLX6+LPPELVX4=28): Type: IMPLANTABLE DEVICE | Site: HUMERUS | Status: FUNCTIONAL

## 2021-10-28 DEVICE — SCREW LOCK 3.5X46MM ST - (4SFLX8=32) W/STARDRIVE: Type: IMPLANTABLE DEVICE | Site: HUMERUS | Status: FUNCTIONAL

## 2021-10-28 DEVICE — SCREW LOCK 3.5X34MM ST T15 - (4SFLX6+LPPELVX4=28): Type: IMPLANTABLE DEVICE | Site: HUMERUS | Status: FUNCTIONAL

## 2021-10-28 DEVICE — SCREW CORT 3.5X14MM ST HEX (4TX6+1TX4+1TX3=31)(SDS=22): Type: IMPLANTABLE DEVICE | Site: CLAVICLE | Status: FUNCTIONAL

## 2021-10-28 RX ORDER — OXYCODONE AND ACETAMINOPHEN 10; 325 MG/1; MG/1
.5-1 TABLET ORAL EVERY 4 HOURS PRN
Qty: 42 TABLET | Refills: 0 | Status: SHIPPED | OUTPATIENT
Start: 2021-10-28 | End: 2021-11-04

## 2021-10-28 RX ORDER — SODIUM CHLORIDE, SODIUM LACTATE, POTASSIUM CHLORIDE, CALCIUM CHLORIDE 600; 310; 30; 20 MG/100ML; MG/100ML; MG/100ML; MG/100ML
INJECTION, SOLUTION INTRAVENOUS CONTINUOUS
Status: ACTIVE | OUTPATIENT
Start: 2021-10-28 | End: 2021-10-28

## 2021-10-28 RX ORDER — MEPERIDINE HYDROCHLORIDE 25 MG/ML
12.5 INJECTION INTRAMUSCULAR; INTRAVENOUS; SUBCUTANEOUS
Status: DISCONTINUED | OUTPATIENT
Start: 2021-10-28 | End: 2021-10-28 | Stop reason: HOSPADM

## 2021-10-28 RX ORDER — OXYCODONE HCL 5 MG/5 ML
10 SOLUTION, ORAL ORAL
Status: COMPLETED | OUTPATIENT
Start: 2021-10-28 | End: 2021-10-28

## 2021-10-28 RX ORDER — DIPHENHYDRAMINE HYDROCHLORIDE 50 MG/ML
12.5 INJECTION INTRAMUSCULAR; INTRAVENOUS
Status: DISCONTINUED | OUTPATIENT
Start: 2021-10-28 | End: 2021-10-28 | Stop reason: HOSPADM

## 2021-10-28 RX ORDER — DEXAMETHASONE SODIUM PHOSPHATE 4 MG/ML
INJECTION, SOLUTION INTRA-ARTICULAR; INTRALESIONAL; INTRAMUSCULAR; INTRAVENOUS; SOFT TISSUE PRN
Status: DISCONTINUED | OUTPATIENT
Start: 2021-10-28 | End: 2021-10-28 | Stop reason: SURG

## 2021-10-28 RX ORDER — HYDROMORPHONE HYDROCHLORIDE 1 MG/ML
0.1 INJECTION, SOLUTION INTRAMUSCULAR; INTRAVENOUS; SUBCUTANEOUS
Status: DISCONTINUED | OUTPATIENT
Start: 2021-10-28 | End: 2021-10-28 | Stop reason: HOSPADM

## 2021-10-28 RX ORDER — ROCURONIUM BROMIDE 10 MG/ML
INJECTION, SOLUTION INTRAVENOUS PRN
Status: DISCONTINUED | OUTPATIENT
Start: 2021-10-28 | End: 2021-10-28 | Stop reason: SURG

## 2021-10-28 RX ORDER — MIDAZOLAM HYDROCHLORIDE 1 MG/ML
INJECTION INTRAMUSCULAR; INTRAVENOUS
Status: COMPLETED
Start: 2021-10-28 | End: 2021-10-28

## 2021-10-28 RX ORDER — ONDANSETRON 2 MG/ML
4 INJECTION INTRAMUSCULAR; INTRAVENOUS
Status: DISCONTINUED | OUTPATIENT
Start: 2021-10-28 | End: 2021-10-28 | Stop reason: HOSPADM

## 2021-10-28 RX ORDER — MIDAZOLAM HYDROCHLORIDE 1 MG/ML
INJECTION INTRAMUSCULAR; INTRAVENOUS PRN
Status: DISCONTINUED | OUTPATIENT
Start: 2021-10-28 | End: 2021-10-28 | Stop reason: SURG

## 2021-10-28 RX ORDER — HYDROMORPHONE HYDROCHLORIDE 1 MG/ML
0.2 INJECTION, SOLUTION INTRAMUSCULAR; INTRAVENOUS; SUBCUTANEOUS
Status: DISCONTINUED | OUTPATIENT
Start: 2021-10-28 | End: 2021-10-28 | Stop reason: HOSPADM

## 2021-10-28 RX ORDER — SODIUM CHLORIDE, SODIUM LACTATE, POTASSIUM CHLORIDE, CALCIUM CHLORIDE 600; 310; 30; 20 MG/100ML; MG/100ML; MG/100ML; MG/100ML
INJECTION, SOLUTION INTRAVENOUS CONTINUOUS
Status: DISCONTINUED | OUTPATIENT
Start: 2021-10-28 | End: 2021-10-28 | Stop reason: HOSPADM

## 2021-10-28 RX ORDER — PROPOFOL 10 MG/ML
INJECTION, EMULSION INTRAVENOUS PRN
Status: DISCONTINUED | OUTPATIENT
Start: 2021-10-28 | End: 2021-10-28 | Stop reason: SURG

## 2021-10-28 RX ORDER — OXYCODONE HCL 5 MG/5 ML
5 SOLUTION, ORAL ORAL
Status: COMPLETED | OUTPATIENT
Start: 2021-10-28 | End: 2021-10-28

## 2021-10-28 RX ORDER — HALOPERIDOL 5 MG/ML
1 INJECTION INTRAMUSCULAR
Status: DISCONTINUED | OUTPATIENT
Start: 2021-10-28 | End: 2021-10-28 | Stop reason: HOSPADM

## 2021-10-28 RX ORDER — SERTRALINE HYDROCHLORIDE 25 MG/1
25 TABLET, FILM COATED ORAL DAILY
COMMUNITY
End: 2022-12-15

## 2021-10-28 RX ORDER — LIDOCAINE HYDROCHLORIDE 20 MG/ML
INJECTION, SOLUTION EPIDURAL; INFILTRATION; INTRACAUDAL; PERINEURAL PRN
Status: DISCONTINUED | OUTPATIENT
Start: 2021-10-28 | End: 2021-10-28 | Stop reason: SURG

## 2021-10-28 RX ORDER — MIDAZOLAM HYDROCHLORIDE 1 MG/ML
1 INJECTION INTRAMUSCULAR; INTRAVENOUS ONCE
Status: COMPLETED | OUTPATIENT
Start: 2021-10-28 | End: 2021-10-28

## 2021-10-28 RX ORDER — ONDANSETRON 2 MG/ML
INJECTION INTRAMUSCULAR; INTRAVENOUS PRN
Status: DISCONTINUED | OUTPATIENT
Start: 2021-10-28 | End: 2021-10-28 | Stop reason: SURG

## 2021-10-28 RX ORDER — CEFAZOLIN SODIUM 1 G/3ML
INJECTION, POWDER, FOR SOLUTION INTRAMUSCULAR; INTRAVENOUS PRN
Status: DISCONTINUED | OUTPATIENT
Start: 2021-10-28 | End: 2021-10-28 | Stop reason: SURG

## 2021-10-28 RX ORDER — KETOROLAC TROMETHAMINE 30 MG/ML
INJECTION, SOLUTION INTRAMUSCULAR; INTRAVENOUS PRN
Status: DISCONTINUED | OUTPATIENT
Start: 2021-10-28 | End: 2021-10-28 | Stop reason: SURG

## 2021-10-28 RX ORDER — BUPIVACAINE HYDROCHLORIDE 2.5 MG/ML
INJECTION, SOLUTION EPIDURAL; INFILTRATION; INTRACAUDAL PRN
Status: DISCONTINUED | OUTPATIENT
Start: 2021-10-28 | End: 2021-10-28 | Stop reason: SURG

## 2021-10-28 RX ORDER — HYDROMORPHONE HYDROCHLORIDE 1 MG/ML
0.4 INJECTION, SOLUTION INTRAMUSCULAR; INTRAVENOUS; SUBCUTANEOUS
Status: DISCONTINUED | OUTPATIENT
Start: 2021-10-28 | End: 2021-10-28 | Stop reason: HOSPADM

## 2021-10-28 RX ADMIN — ROCURONIUM BROMIDE 10 MG: 10 INJECTION, SOLUTION INTRAVENOUS at 14:49

## 2021-10-28 RX ADMIN — LIDOCAINE HYDROCHLORIDE 100 MG: 20 INJECTION, SOLUTION EPIDURAL; INFILTRATION; INTRACAUDAL at 13:35

## 2021-10-28 RX ADMIN — CEFAZOLIN 3 G: 330 INJECTION, POWDER, FOR SOLUTION INTRAMUSCULAR; INTRAVENOUS at 13:40

## 2021-10-28 RX ADMIN — PROPOFOL 200 MG: 10 INJECTION, EMULSION INTRAVENOUS at 13:35

## 2021-10-28 RX ADMIN — MIDAZOLAM HYDROCHLORIDE 2 MG: 1 INJECTION, SOLUTION INTRAMUSCULAR; INTRAVENOUS at 13:21

## 2021-10-28 RX ADMIN — BUPIVACAINE HYDROCHLORIDE 25 ML: 2.5 INJECTION, SOLUTION EPIDURAL; INFILTRATION; INTRACAUDAL; PERINEURAL at 13:27

## 2021-10-28 RX ADMIN — FENTANYL CITRATE 50 MCG: 50 INJECTION, SOLUTION INTRAMUSCULAR; INTRAVENOUS at 16:14

## 2021-10-28 RX ADMIN — FENTANYL CITRATE 25 MCG: 50 INJECTION, SOLUTION INTRAMUSCULAR; INTRAVENOUS at 16:51

## 2021-10-28 RX ADMIN — MIDAZOLAM HYDROCHLORIDE 1 MG: 1 INJECTION INTRAMUSCULAR; INTRAVENOUS at 17:26

## 2021-10-28 RX ADMIN — OXYCODONE HYDROCHLORIDE 10 MG: 5 SOLUTION ORAL at 16:51

## 2021-10-28 RX ADMIN — KETOROLAC TROMETHAMINE 30 MG: 30 INJECTION, SOLUTION INTRAMUSCULAR at 16:16

## 2021-10-28 RX ADMIN — SODIUM CHLORIDE, POTASSIUM CHLORIDE, SODIUM LACTATE AND CALCIUM CHLORIDE: 600; 310; 30; 20 INJECTION, SOLUTION INTRAVENOUS at 12:44

## 2021-10-28 RX ADMIN — FENTANYL CITRATE 50 MCG: 50 INJECTION, SOLUTION INTRAMUSCULAR; INTRAVENOUS at 16:16

## 2021-10-28 RX ADMIN — DEXAMETHASONE SODIUM PHOSPHATE 8 MG: 4 INJECTION, SOLUTION INTRA-ARTICULAR; INTRALESIONAL; INTRAMUSCULAR; INTRAVENOUS; SOFT TISSUE at 13:42

## 2021-10-28 RX ADMIN — FENTANYL CITRATE 50 MCG: 50 INJECTION, SOLUTION INTRAMUSCULAR; INTRAVENOUS at 17:11

## 2021-10-28 RX ADMIN — ROCURONIUM BROMIDE 10 MG: 10 INJECTION, SOLUTION INTRAVENOUS at 15:00

## 2021-10-28 RX ADMIN — ROCURONIUM BROMIDE 70 MG: 10 INJECTION, SOLUTION INTRAVENOUS at 13:35

## 2021-10-28 RX ADMIN — SODIUM CHLORIDE, POTASSIUM CHLORIDE, SODIUM LACTATE AND CALCIUM CHLORIDE: 600; 310; 30; 20 INJECTION, SOLUTION INTRAVENOUS at 17:30

## 2021-10-28 RX ADMIN — MIDAZOLAM HYDROCHLORIDE 1 MG: 1 INJECTION, SOLUTION INTRAMUSCULAR; INTRAVENOUS at 17:26

## 2021-10-28 RX ADMIN — FENTANYL CITRATE 100 MCG: 50 INJECTION, SOLUTION INTRAMUSCULAR; INTRAVENOUS at 13:35

## 2021-10-28 RX ADMIN — SUGAMMADEX 200 MG: 100 INJECTION, SOLUTION INTRAVENOUS at 16:16

## 2021-10-28 RX ADMIN — ONDANSETRON 4 MG: 2 INJECTION INTRAMUSCULAR; INTRAVENOUS at 16:11

## 2021-10-28 ASSESSMENT — PAIN DESCRIPTION - PAIN TYPE
TYPE: ACUTE PAIN

## 2021-10-28 ASSESSMENT — FIBROSIS 4 INDEX: FIB4 SCORE: 1.41

## 2021-10-28 NOTE — ANESTHESIA PROCEDURE NOTES
Peripheral Block    Date/Time: 10/28/2021 1:22 PM  Performed by: Cristian Villa M.D.  Authorized by: Cristian Villa M.D.     Patient Location:  OR  Start Time:  10/28/2021 1:22 PM  End Time:  10/28/2021 1:27 PM  Reason for Block: at surgeon's request and post-op pain management ONLY    patient identified, IV checked, site marked, risks and benefits discussed, surgical consent, monitors and equipment checked, pre-op evaluation and timeout performed    Patient Position:  Supine  Prep: ChloraPrep    Monitoring:  Heart rate, continuous pulse ox and cardiac monitor  Block Region:  Upper Extremity  Upper Extremity - Block Type:  BRACHIAL PLEXUS block, Interscalene approach    Laterality:  Left  Procedures: ultrasound guided  Image captured, interpreted and electronically stored.  Local Infiltration:  Lidocaine  Strength:  1 %  Dose:  3 ml  Block Type:  Single-shot  Needle Length:  100mm  Needle Gauge:  21 G  Needle Localization:  Ultrasound guidance  Injection Assessment:  Negative aspiration for heme, no paresthesia on injection, incremental injection and local visualized surrounding nerve on ultrasound  Evidence of intravascular injection: No     US Guided Interscalene Brachial Plexus Block   US transducer placed on the neck in oblique plane approximately at the level of C6.  Anterior and Middle Scalene (MSM) muscles identified with nerve trunks identified between the muscles.  Needle inserted lateral to probe and advanced under direct visualization through the MSM into a perineural position.  After negative aspiration, LA injected with ease and visualized surrounding the nerve trunks.

## 2021-10-28 NOTE — ANESTHESIA TIME REPORT
Anesthesia Start and Stop Event Times     Date Time Event    10/28/2021 1310 Ready for Procedure     1321 Anesthesia Start     1628 Anesthesia Stop        Responsible Staff  10/28/21    Name Role Begin End    Cristian Villa M.D. Anesth 1321 1628        Preop Diagnosis (Free Text):  Pre-op Diagnosis     CLOSED FRACTURE OF PROXIMAL LEFT HUMERUS        Preop Diagnosis (Codes):    Premium Reason  A. 3PM - 7AM    Comments:

## 2021-10-28 NOTE — OR SURGEON
Immediate Post OP Note    PreOp Diagnosis: Left clavicle shaft fracture, left proximal humerus fracture      PostOp Diagnosis: same      Procedure(s):  ORIF, FRACTURE, HUMERUS - PROXIMAL - Wound Class: Clean  ORIF, FRACTURE, CLAVICLE - AND REPAIRS AS INDICATED - Wound Class: Clean    Surgeon(s):  Darryl Thomas M.D.    Anesthesiologist/Type of Anesthesia:  Anesthesiologist: Cristian Villa M.D./Block    Surgical Staff:  Circulator: Giuliana Ellis R.N.  Relief Circulator: Kate Fleming R.N.  Scrub Person: Brendon Balderas  Radiology Technologist: Sb Daniel    Specimens removed if any:  * No specimens in log *    Estimated Blood Loss: 200cc    Findings: see dictation    Complications: none known    PLAN:  --discharge to home from PACU  --NWB LUE with sling for comfort  --okay for gentle shoulder ROM as tolerated  --Rx for percocet sent to pharmacy PRN moderate postop pain  --fu 2 weeks postop as scheduled        10/28/2021 4:19 PM Darryl Thomas M.D.

## 2021-10-28 NOTE — ANESTHESIA PROCEDURE NOTES
Airway    Date/Time: 10/28/2021 1:37 PM  Performed by: Cristian Villa M.D.  Authorized by: Cristian Villa M.D.     Location:  OR  Urgency:  Elective  Indications for Airway Management:  Anesthesia      Spontaneous Ventilation: absent    Sedation Level:  Deep  Preoxygenated: Yes    Patient Position:  Sniffing  Final Airway Type:  Endotracheal airway  Final Endotracheal Airway:  ETT  Cuffed: Yes    Technique Used for Successful ETT Placement:  Direct laryngoscopy    Insertion Site:  Oral  Blade Type:  Eusebio  Laryngoscope Blade/Videolaryngoscope Blade Size:  3  ETT Size (mm):  7.0  Measured from:  Teeth  ETT to Teeth (cm):  23  Placement Verified by: auscultation and capnometry    Cormack-Lehane Classification:  Grade IIa - partial view of glottis  Number of Attempts at Approach:  1

## 2021-10-28 NOTE — ANESTHESIA PREPROCEDURE EVALUATION
BMI 42, otherwise denies PMH    Relevant Problems   No relevant active problems       Physical Exam    Airway   Mallampati: II  TM distance: >3 FB  Neck ROM: full       Cardiovascular - normal exam  Rhythm: regular  Rate: normal  (-) murmur     Dental - normal exam           Pulmonary - normal exam  Breath sounds clear to auscultation     Abdominal    Neurological - normal exam                 Anesthesia Plan    ASA 3   ASA physical status 3 criteria: morbid obesity - BMI greater than or equal to 40    Plan - general       Airway plan will be ETT          Induction: intravenous    Postoperative Plan: Postoperative administration of opioids is intended.    Pertinent diagnostic labs and testing reviewed    Informed Consent:    Anesthetic plan and risks discussed with patient.    Use of blood products discussed with: patient whom consented to blood products.          decreased strength

## 2021-10-28 NOTE — PROGRESS NOTES
Pharmacy Medication Reconciliation      ~Medication reconciliation updated and complete per pt at bedside  ~Allergies have been verified and updated   ~No oral ABX within the last 30 days  ~Patient home pharmacy:[facility]

## 2021-10-29 NOTE — OR NURSING
Pt A&Ox4. Pt reports feeling less emotional.   VSS on RA. Pt using IS to encourage deep breathing. Coughing encouraged as well. Pt sitting upright in [facility]. Ginger ale and water given. No c/o nausea.   Left arm in a sling. Surgical dressings CDI. Ice pack in place. Pt able to move left fingers slightly. Numbness and tingling present s/p nerve block.    Pt's sister called and updated and is anticipating a call from the d/c RN.     Report called to KARTHIK Christopher and pt transported to Phase II via [facility].

## 2021-10-29 NOTE — OR NURSING
"Pt emerged from anesthesia tearful. Emotional support provided. As pt became more alert she verbalized feeling \"weird\" and \"scared\". Extensive emotional support provided.   Dr. Villa notified and order received for 1mg Midazolam IVP.  Pt now resting calmly and RN is able to titrate down O2.   Will continue to monitor and provide emotional support as needed.   "

## 2021-10-29 NOTE — OP REPORT
DATE OF SERVICE:  10/28/2021     PREOPERATIVE DIAGNOSES:  1.  Left displaced clavicle fracture.  2.  Left displaced proximal humerus fracture.     POSTOPERATIVE DIAGNOSES:   1.  Left displaced clavicle fracture.  2.  Left displaced proximal humerus fracture.     PROCEDURES PERFORMED:  1.  Open treatment and internal fixation, left clavicle shaft fracture.  2.  Open treatment and internal fixation, left proximal humerus fracture.     SURGEON:  Darryl Thomas MD     ANESTHESIOLOGIST:  Cristian Villa MD     ANESTHESIA:  General and regional.     ESTIMATED BLOOD LOSS:  200 mL.     IMPLANTS:  1.  Synthes 7-hole clavicle locking plate.  2.  Synthes 5-hole proximal humerus locking plate.     INDICATIONS FOR PROCEDURE:  The patient is a 66-year-old female.  She had a   fall a little less than a week ago, sustained a left displaced clavicle shaft   fracture and proximal humerus fracture, which was also displaced and   comminuted.  I met her in my clinic yesterday after she was discharged from   the hospital several days prior and we discussed treatment options.  I   recommended surgical reduction and fixation for her unstable and displaced   clavicle and proximal humerus fractures given the comminution of the injuries.    We did discuss nonoperative treatment options.  After discussing risks,   benefits and alternatives as well as pros and cons of surgical versus   nonsurgical management, she elected to proceed with surgical fixation as   outlined above.     DESCRIPTION OF PROCEDURE:  The patient was met in the preoperative holding   area.  Her surgical site was signed.  Her consent was confirmed to be   accurate.  She was taken back to the operating room and Dr. Villa performed   regional anesthetic at my request to help with postoperative pain control.    General anesthesia was induced.  Left upper extremity was then prepped and   draped in the usual sterile fashion.  A formal timeout was performed to   confirm the patient's  correct name, correct surgical site, correct procedure   and correct laterality.  An incision was made over the anterior aspect of the   clavicle with a scalpel down through skin.  Dissection was carried with Bovie   cautery down through the platysma, which was split with Bovie cautery and   dissection was carried down to the fracture site.  The lateral and medial   fragments were identified, cleared of soft tissue.  There was some mild amount   of comminution present, but overall I was able to achieve a reasonable   reduction with reduction forceps.  I then positioned a Synthes 7-hole clavicle   shaft locking plate.  It fit the clavicle better in the reverse orientation   and I fixed it medially and laterally with bicortical nonlocking screws,   confirmed fluoroscopically that the reduction was acceptable.  I place a   couple of more bicortical nonlocking screws and a couple of bicortical locking   screws to stabilize the fracture.  Final fluoroscopic imaging of the clavicle   confirmed overall acceptable alignment of the fracture and acceptable   positioning of the implants.  I irrigated the wound with normal saline.  I   reapproximated the deep fascial layers with 0 Vicryl, subcu layers with 0   Vicryl, 2-0 Vicryl and the skin edges with staples.  I then turned my   attention to the proximal humerus.  A deltopectoral approach to the proximal   humerus was performed with a scalpel down through skin.  Dissection was   carried with Bovie electrocautery down through subcutaneous tissue, identified   the cephalic vein.  There was a branch that was inadvertently torn off the   vein.  I was then able to obtain hemostasis.  Dissection was carried through   the deltopectoral interval bluntly down to the fracture site.  I released just   a small amount of anterior deltoid tendon insertion off the lateral humerus   to allow for positioning of a 5-hole lateral plate.  I exposed the rotator   cuff and the proximal humerus.   The long head of biceps tendon within the   groove at the level of the fracture site was a little bit contused, but intact   overall.  I placed several #2 FiberWire in the supraspinatus and   infraspinatus tendons posteriorly to control the rotation and alignment of the   head fragment.  There was a partial thickness bursal-sided rotator cuff tear   at the supraspinatus and infraspinatus junction, but I had reinforced this   area with some #2 FiberWire for later attaching down to the plate.  I was able   to improve the overall alignment confirmed fluoroscopically and stabilized   with several 1.6 mm K-wires and then positioned a laterally based 5-hole   proximal humerus locking plate with appropriate position, pinned it into   place, fixed to the shaft with bicortical nonlocking screw.  I confirmed the   fracture alignment was acceptable and fixed it with another bicortical   nonlocking screw distally and several locking screws unicortically proximally.    I placed another bicortical nonlocking screw distally and a locking screw   distally as well as, stabilizing the plate to bone.  Overall, the fracture   alignment I felt fluoroscopically was acceptable.  The implant position was   acceptable.  All screws were appeared to be below subchondral bone at the head   of the humerus.  There was a little bit of prominence of the greater   tuberosity suggesting a slight amount of varus alignment, but overall there   was good alignment in general and I felt that she would do well with this   reduction.  I then passed the #2 FiberWire, which I previously placed through   the supraspinatus and infraspinatus tendons through holes in the proximal   humerus plate with a shuttle suture and carefully tied them down to the plate   in order to reinforce the proximal segment to the plate.  I then irrigated the   wound with normal saline and repaired the deep layers including a portion of   the released deltoid tendon with 0 Vicryl to  the adjacent fascia, subcu layers   with 0 Vicryl, 2-0 Vicryl and the skin edges with staples.  The wounds were   thoroughly cleansed, dried and sterile occlusive dressing was applied.  She   then had the drapes removed, placed in a sling, awoken from anesthesia,   transferred on the [place] and taken to postanesthesia care unit in stable   condition.     PLAN:  1.  The patient will be discharged home from the PACU when she recovers from   anesthesia.  2.  She should be nonweightbearing left upper extremity with a sling for   comfort, but can start early shoulder range of motion as tolerated.  3.  She will follow up as scheduled in 2 weeks? postop for routine wound check   and staple removal.  4.  I will discharge her with a prescription for Percocet as needed for   moderate postoperative pain.  She can take over-the-counter naproxen and/or   Tylenol as needed for mild postoperative pain.        ______________________________  MD ABRIL Fish/FARSHAD/MICHELLE    DD:  10/28/2021 16:30  DT:  10/28/2021 19:02    Job#:  [id number]

## 2021-10-29 NOTE — OR NURSING
Discharge order in place. Pt's VSS; denies N/V; states pain is at tolerable level. Dressing C/D/I to L shoulder. Pt voided adequately in PACU. Discharge instructions given as well as pain management handout; pt verbalized understanding and questions answered. Pt changed into clothing with assistance. IV d/c'd. Patient states ready to d/c home. Prescription for Percocet sent to pharmacy. Pt dc'd home in wheelchair by RN.

## 2021-10-29 NOTE — ANESTHESIA POSTPROCEDURE EVALUATION
Patient: Magdalena Ascencio    Procedure Summary     Date: 10/28/21 Room / Location: [place] [id number] / SURGERY [facility]    Anesthesia Start: 1321 Anesthesia Stop: 1628    Procedures:       ORIF, FRACTURE, HUMERUS - PROXIMAL (Left Arm Upper)      ORIF, FRACTURE, CLAVICLE - AND REPAIRS AS INDICATED (Left Shoulder) Diagnosis: (CLOSED FRACTURE OF PROXIMAL LEFT HUMERUS)    Surgeons: Darryl Thomas M.D. Responsible Provider: Cristian Villa M.D.    Anesthesia Type: general ASA Status: 3          Final Anesthesia Type: general  Last vitals  BP   Blood Pressure: 111/57    Temp   36.1 °C (97 °F)    Pulse   73   Resp   20    SpO2   91 %      Anesthesia Post Evaluation    Patient location during evaluation: PACU  Patient participation: complete - patient participated  Level of consciousness: awake and alert    Airway patency: patent  Anesthetic complications: no  Cardiovascular status: hemodynamically stable  Respiratory status: acceptable  Hydration status: euvolemic    PONV: none          No complications documented.     Nurse Pain Score: 5 (NPRS)

## 2021-10-29 NOTE — DISCHARGE INSTRUCTIONS
ACTIVITY: Rest and take it easy for the first 24 hours.  A responsible adult is recommended to remain with you during that time.  It is normal to feel sleepy.  We encourage you to not do anything that requires balance, judgment or coordination.    MILD FLU-LIKE SYMPTOMS ARE NORMAL. YOU MAY EXPERIENCE GENERALIZED MUSCLE ACHES, THROAT IRRITATION, HEADACHE AND/OR SOME NAUSEA.    FOR 24 HOURS DO NOT:  Drive, operate machinery or run household appliances.  Drink beer or alcoholic beverages.   Make important decisions or sign legal documents.    SPECIAL INSTRUCTIONS:   - Non-weight bearing to left arm with sling for comfort.  - OK for gentle shoulder range of motion as tolerated.  - OK to shower starting post-op day one with occlusive dressings.  - OK to change dressings if needed after 4-5 days. Dressings can stay in place until follow-up appointment if they stay clean/dry/intact, otherwise keep incisions clean/dry/covered.  - Prescription for Percocet sent to pharmacy, take as needed for moderate postop pain. OK to take over the counter naproxen and/or tylenol as needed for mild postop pain.  - Follow-up in 2 weeks postop as scheduled.    DIET: To avoid nausea, slowly advance diet as tolerated, avoiding spicy or greasy foods for the first day.  Add more substantial food to your diet according to your physician's instructions.  INCREASE FLUIDS AND FIBER TO AVOID CONSTIPATION.    You should CALL YOUR PHYSICIAN if you develop:  Fever greater than 101 degrees F.  Pain not relieved by medication, or persistent nausea or vomiting.  Excessive bleeding (blood soaking through dressing) or unexpected drainage from the wound.  Extreme redness or swelling around the incision site, drainage of pus or foul smelling drainage.  Inability to urinate or empty your bladder within 8 hours.  Problems with breathing or chest pain.    You should call 911 if you develop problems with breathing or chest pain.  If you are unable to contact  your doctor or surgical center, you should go to the nearest emergency room or urgent care center.    Physician's telephone #: [phone]    If any questions arise, call your doctor.  If your doctor is not available, please feel free to call the Surgical Center at [phone]. The Contact Center is open Monday through Friday 7AM to 5PM and may speak to a nurse at [phone], or toll free at [phone].     A registered nurse may call you a few days after your surgery to see how you are doing after your procedure.    MEDICATIONS: Resume taking daily medication.  Take prescribed pain medication with food.  If no medication is prescribed, you may take non-aspirin pain medication if needed.  PAIN MEDICATION CAN BE VERY CONSTIPATING.  Take a stool softener or laxative such as senokot, pericolace, or milk of magnesia if needed.    Last pain medication given at Oxycodone 10 mg at 4:51 pm.    If your physician has prescribed pain medication that includes Acetaminophen (Tylenol), do not take additional Acetaminophen (Tylenol) while taking the prescribed medication.    Depression / Suicide Risk    As you are discharged from this [facility] facility, it is important to learn how to keep safe from harming yourself.    Recognize the warning signs:  · Abrupt changes in personality, positive or negative- including increase in energy   · Giving away possessions  · Change in eating patterns- significant weight changes-  positive or negative  · Change in sleeping patterns- unable to sleep or sleeping all the time   · Unwillingness or inability to communicate  · Depression  · Unusual sadness, discouragement and loneliness  · Talk of wanting to die  · Neglect of personal appearance   · Rebelliousness- reckless behavior  · Withdrawal from people/activities they love  · Confusion- inability to concentrate     If you or a loved one observes any of these behaviors or has concerns about self-harm, here's what you can  do:  · Talk about it- your feelings and reasons for harming yourself  · Remove any means that you might use to hurt yourself (examples: pills, rope, extension cords, firearm)  · Get professional help from the community (Mental Health, Substance Abuse, psychological counseling)  · Do not be alone:Call your Safe Contact- someone whom you trust who will be there for you.  · Call your local CRISIS HOTLINE 383-8510 or 415-739-3584  · Call your local Children's Mobile Crisis Response Team [place] (048) 743-0396 or www.[employer]  · Call the toll free National Suicide Prevention Hotlines   · National Suicide Prevention Lifeline 070-206-IWTL (0148)  · National Hope Line Network 800-SUICIDE (356-7275)

## 2021-11-03 ENCOUNTER — TELEMEDICINE (OUTPATIENT)
Dept: MEDICAL GROUP | Facility: IMAGING CENTER | Age: 66
End: 2021-11-03
Payer: COMMERCIAL

## 2021-11-03 VITALS — BODY MASS INDEX: 41.78 KG/M2 | HEIGHT: 66 IN | WEIGHT: 260 LBS

## 2021-11-03 DIAGNOSIS — D75.1 POLYCYTHEMIA: ICD-10-CM

## 2021-11-03 DIAGNOSIS — L30.4 INTERTRIGO: ICD-10-CM

## 2021-11-03 DIAGNOSIS — N64.59 SIGN AND SYMPTOM IN BREAST: ICD-10-CM

## 2021-11-03 DIAGNOSIS — F33.1 MODERATE EPISODE OF RECURRENT MAJOR DEPRESSIVE DISORDER (HCC): ICD-10-CM

## 2021-11-03 DIAGNOSIS — Z98.890 HISTORY OF ORTHOPEDIC SURGERY: ICD-10-CM

## 2021-11-03 PROBLEM — S43.006A DISLOCATION OF SHOULDER JOINT: Status: ACTIVE | Noted: 2021-11-03

## 2021-11-03 PROBLEM — M75.120 FULL THICKNESS ROTATOR CUFF TEAR: Status: ACTIVE | Noted: 2021-11-03

## 2021-11-03 PROBLEM — S42.209A CLOSED FRACTURE OF UPPER END OF HUMERUS: Status: ACTIVE | Noted: 2021-11-03

## 2021-11-03 PROCEDURE — 99213 OFFICE O/P EST LOW 20 MIN: CPT | Mod: 95 | Performed by: FAMILY MEDICINE

## 2021-11-03 ASSESSMENT — FIBROSIS 4 INDEX: FIB4 SCORE: 1.24

## 2021-11-03 ASSESSMENT — PAIN SCALES - GENERAL: PAINLEVEL: 8=MODERATE-SEVERE PAIN

## 2021-11-03 NOTE — PROGRESS NOTES
Telemedicine: Established Patient   This evaluation was conducted via Zoom using secure and encrypted videoconferencing technology. The patient was in a private location in the state of [place].    The patient's identity was confirmed and verbal consent was obtained for this virtual visit.    Subjective:     Chief Complaint   Patient presents with   • Arm Pain     broke left clavical 1 week ago, f/u with surgeon on 11/10   • Medication Management     has not started new medications       Magdalena Ascencio is a 66 y.o. female with MDD presenting for evaluation and management of: follow up of MDD, rash and polycythemia.    Had left arm fracture about a week ago.  Had surgery of left humerus and clavicle ORIF and and has follow-up with the surgeon in near future.  Stepped off front porch and fell. Reached up to get mail, stepped back and hit edge of step which cause her to fall.   On pain medication, but has been reducing and stretching out.  Been able to manage her pain.  Her brother will be coming to help.  She is currently on medical leave from work.  Has noted some swelling and tightness.  Has used some Vaseline on area of stitches seem to help.    Notes she was started to work on her issues since last visit but had recent setback with her injury.  MDD-has not started Zoloft yet.  Denies HI/SI.  Does continue to smoke currently.     Intertrigo/sign and symptom of breast-states she has not started the powder as she is not able to move her arm well.  Has not yet completed mammogram.    Polycythemia -appears improved on recent lab work.    ROS  Denies any recent fevers or chills. No nausea or vomiting. No diarrhea. No chest pains or shortness of breath. No lower extremity edema.        Allergies   Allergen Reactions   • Sulfa Drugs Rash     Minor rash       Current medicines (including changes today)  Current Outpatient Medications   Medication Sig Dispense Refill   • oxyCODONE-acetaminophen (PERCOCET-10)  MG  "Tab Take 0.5-1 Tablets by mouth every four hours as needed for up to 7 days. 42 Tablet 0   • sertraline (ZOLOFT) 25 MG tablet Take 25 mg by mouth every day. (Patient not taking: Reported on 11/3/2021)     • nystatin (MYCOSTATIN) powder Apply 1 g topically 3 times a day. (Patient not taking: Reported on 11/3/2021) 30 g 0   • naproxen (NAPROSYN) 250 MG Tab Take 250-500 mg by mouth every four hours as needed (PAIN). (Patient not taking: Reported on 11/3/2021)       No current facility-administered medications for this visit.       Patient Active Problem List    Diagnosis Date Noted   • Closed fracture of upper end of humerus 11/03/2021   • Dislocation of shoulder joint 11/03/2021   • Full thickness rotator cuff tear 11/03/2021   • Marijuana use 02/27/2020   • BMI 40.0-44.9, adult (HCC) 03/25/2016   • Psoriasis 03/25/2016       Family History   Problem Relation Age of Onset   • Dementia Mother    • Cancer Father         brain Ca   • Heart Attack Brother 45       She  has a past medical history of Blood transfusion without reported diagnosis, Hypertension, IBD (inflammatory bowel disease), Pain, Psychiatric problem, and Shingles. She also has no past medical history of Anxiety, Clotting disorder ([facility]), COPD (chronic obstructive pulmonary disease) ([facility]), Depression, Diabetic neuropathy ([facility]), GERD (gastroesophageal reflux disease), Goiter, Hyperlipidemia, Migraine, Muscle disorder, Osteoporosis, or Substance abuse ([facility]).  She  has a past surgical history that includes tonsillectomy (8 yoa); dilation and curettage (22s); pr open treatment clavicular fracture internal * (Left, 10/28/2021); and humerus orif (Left, 10/28/2021).       Objective:   Ht 1.676 m (5' 6\")   Wt 118 kg (260 lb)   BMI 41.97 kg/m²     Physical Exam  Constitutional: Alert, no distress, well-groomed.  Skin: No rashes in visible areas.  Eye: Round. Conjunctiva clear, lids normal. No icterus.   ENMT: Lips pink without lesions, good dentition, moist mucous " membranes. Phonation normal.  Neck: No masses, no thyromegaly. Moves freely without pain.  CV: Pulse as reported by patient  Respiratory: Unlabored respiratory effort, no cough or audible wheeze  Psych: Alert and oriented x3, tearful during visit.      Ref. Range 10/28/2021 11:42   WBC Latest Ref Range: 4.8 - 10.8 K/uL 11.9 (H)   RBC Latest Ref Range: 4.20 - 5.40 M/uL 4.43   Hemoglobin Latest Ref Range: 12.0 - 16.0 g/dL 13.4   Hematocrit Latest Ref Range: 37.0 - 47.0 % 40.1   MCV Latest Ref Range: 81.4 - 97.8 fL 90.5   MCH Latest Ref Range: 27.0 - 33.0 pg 30.2   MCHC Latest Ref Range: 33.6 - 35.0 g/dL 33.4 (L)   RDW Latest Ref Range: 35.9 - 50.0 fL 47.0   Platelet Count Latest Ref Range: 164 - 446 K/uL 318   MPV Latest Ref Range: 9.0 - 12.9 fL 10.2   Sodium Latest Ref Range: 135 - 145 mmol/L 139   Potassium Latest Ref Range: 3.6 - 5.5 mmol/L 3.8   Chloride Latest Ref Range: 96 - 112 mmol/L 105   Co2 Latest Ref Range: 20 - 33 mmol/L 22   Anion Gap Latest Ref Range: 7.0 - 16.0  12.0   Glucose Latest Ref Range: 65 - 99 mg/dL 122 (H)   Bun Latest Ref Range: 8 - 22 mg/dL 17   Creatinine Latest Ref Range: 0.50 - 1.40 mg/dL 0.59   GFR If  Latest Ref Range: >60 mL/min/1.73 m 2 >60   GFR If Non  Latest Ref Range: >60 mL/min/1.73 m 2 >60   Calcium Latest Ref Range: 8.5 - 10.5 mg/dL 9.1       Assessment and Plan:   The following treatment plan was discussed:     This is a 66 y.o. female with MDD.      1. History of orthopedic surgery     2. Moderate episode of recurrent major depressive disorder (HCC)     3. Sign and symptom in breast     4. Intertrigo     5. Polycythemia     -History of orthopedic surgery-recent ORIF of left humerus and clavicle. Hx of fall.   Counseled on management of current symptoms.  Patient will follow up with orthopedics.  Continue fall precautions.  Reviewed recommendations for continued activity as tolerated and rehabilitation.  -MDD-stable, has not started medication  therapy.  Discussed recommendations for starting medication therapy.   Discussed management of current symptoms and tools.  Recommend healthy diet, adequate fluids and depression/stress management. Will continue to work on recommendations as patient recovers from her recent injury and surgery. Continue to monitor.   -Signs and symptoms of breast/intertrigo-patient to use topical therapy when able.  Also complete mammogram in future.  -Polycythemia appears improved on most recent labs.  Monitor.    Follow-up: Return in about 1 month (around 12/3/2021).  Follow up sooner as needed.          This medical record contains text that has been entered with the assistance of computer voice recognition and dictation software.  Therefore, it may contain unintended errors in text, spelling, punctuation, or grammar.

## 2022-11-10 ENCOUNTER — OFFICE VISIT (OUTPATIENT)
Dept: MEDICAL GROUP | Facility: IMAGING CENTER | Age: 67
End: 2022-11-10
Payer: COMMERCIAL

## 2022-11-10 VITALS
OXYGEN SATURATION: 97 % | WEIGHT: 260 LBS | BODY MASS INDEX: 41.78 KG/M2 | SYSTOLIC BLOOD PRESSURE: 128 MMHG | RESPIRATION RATE: 19 BRPM | TEMPERATURE: 97.9 F | DIASTOLIC BLOOD PRESSURE: 80 MMHG | HEART RATE: 80 BPM | HEIGHT: 66 IN

## 2022-11-10 DIAGNOSIS — F41.9 ANXIETY: ICD-10-CM

## 2022-11-10 DIAGNOSIS — Z13.1 SCREENING FOR DIABETES MELLITUS: ICD-10-CM

## 2022-11-10 DIAGNOSIS — Z13.220 SCREENING, LIPID: ICD-10-CM

## 2022-11-10 DIAGNOSIS — F17.200 SMOKING: ICD-10-CM

## 2022-11-10 DIAGNOSIS — D75.1 POLYCYTHEMIA: ICD-10-CM

## 2022-11-10 DIAGNOSIS — Z12.11 SCREEN FOR COLON CANCER: ICD-10-CM

## 2022-11-10 DIAGNOSIS — F33.1 MODERATE EPISODE OF RECURRENT MAJOR DEPRESSIVE DISORDER (HCC): ICD-10-CM

## 2022-11-10 DIAGNOSIS — L40.9 PSORIASIS: ICD-10-CM

## 2022-11-10 DIAGNOSIS — F43.9 STRESS: ICD-10-CM

## 2022-11-10 DIAGNOSIS — Z12.31 ENCOUNTER FOR SCREENING MAMMOGRAM FOR MALIGNANT NEOPLASM OF BREAST: ICD-10-CM

## 2022-11-10 DIAGNOSIS — Z78.0 POST-MENOPAUSAL: ICD-10-CM

## 2022-11-10 DIAGNOSIS — Z11.59 NEED FOR HEPATITIS C SCREENING TEST: ICD-10-CM
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
## 2022-11-10 DIAGNOSIS — Z00.00 WELL ADULT EXAM: ICD-10-CM

## 2022-11-10 DIAGNOSIS — Z23 NEED FOR IMMUNIZATION AGAINST INFLUENZA: ICD-10-CM

## 2022-11-10 PROCEDURE — 99397 PER PM REEVAL EST PAT 65+ YR: CPT | Mod: 25 | Performed by: FAMILY MEDICINE

## 2022-11-10 PROCEDURE — 90471 IMMUNIZATION ADMIN: CPT | Performed by: FAMILY MEDICINE

## 2022-11-10 PROCEDURE — 90662 IIV NO PRSV INCREASED AG IM: CPT | Performed by: FAMILY MEDICINE

## 2022-11-10 RX ORDER — SERTRALINE HYDROCHLORIDE 25 MG/1
25 TABLET, FILM COATED ORAL DAILY
Qty: 90 TABLET | Refills: 1 | Status: SHIPPED | OUTPATIENT
Start: 2022-11-10 | End: 2023-06-01 | Stop reason: SDUPTHER

## 2022-11-10 SDOH — ECONOMIC STABILITY: FOOD INSECURITY: WITHIN THE PAST 12 MONTHS, YOU WORRIED THAT YOUR FOOD WOULD RUN OUT BEFORE YOU GOT MONEY TO BUY MORE.: NEVER TRUE

## 2022-11-10 SDOH — ECONOMIC STABILITY: INCOME INSECURITY: IN THE LAST 12 MONTHS, WAS THERE A TIME WHEN YOU WERE NOT ABLE TO PAY THE MORTGAGE OR RENT ON TIME?: NO

## 2022-11-10 SDOH — ECONOMIC STABILITY: HOUSING INSECURITY
IN THE LAST 12 MONTHS, WAS THERE A TIME WHEN YOU DID NOT HAVE A STEADY PLACE TO SLEEP OR SLEPT IN A SHELTER (INCLUDING NOW)?: NO

## 2022-11-10 SDOH — ECONOMIC STABILITY: HOUSING INSECURITY: IN THE LAST 12 MONTHS, HOW MANY PLACES HAVE YOU LIVED?: 1

## 2022-11-10 SDOH — ECONOMIC STABILITY: FOOD INSECURITY: WITHIN THE PAST 12 MONTHS, THE FOOD YOU BOUGHT JUST DIDN'T LAST AND YOU DIDN'T HAVE MONEY TO GET MORE.: NEVER TRUE

## 2022-11-10 SDOH — HEALTH STABILITY: MENTAL HEALTH
STRESS IS WHEN SOMEONE FEELS TENSE, NERVOUS, ANXIOUS, OR CAN'T SLEEP AT NIGHT BECAUSE THEIR MIND IS TROUBLED. HOW STRESSED ARE YOU?: TO SOME EXTENT

## 2022-11-10 SDOH — ECONOMIC STABILITY: TRANSPORTATION INSECURITY
IN THE PAST 12 MONTHS, HAS LACK OF RELIABLE TRANSPORTATION KEPT YOU FROM MEDICAL APPOINTMENTS, MEETINGS, WORK OR FROM GETTING THINGS NEEDED FOR DAILY LIVING?: NO

## 2022-11-10 SDOH — HEALTH STABILITY: PHYSICAL HEALTH: ON AVERAGE, HOW MANY DAYS PER WEEK DO YOU ENGAGE IN MODERATE TO STRENUOUS EXERCISE (LIKE A BRISK WALK)?: 0 DAYS

## 2022-11-10 SDOH — ECONOMIC STABILITY: TRANSPORTATION INSECURITY
IN THE PAST 12 MONTHS, HAS THE LACK OF TRANSPORTATION KEPT YOU FROM MEDICAL APPOINTMENTS OR FROM GETTING MEDICATIONS?: NO

## 2022-11-10 SDOH — HEALTH STABILITY: PHYSICAL HEALTH

## 2022-11-10 SDOH — ECONOMIC STABILITY: TRANSPORTATION INSECURITY
IN THE PAST 12 MONTHS, HAS LACK OF TRANSPORTATION KEPT YOU FROM MEETINGS, WORK, OR FROM GETTING THINGS NEEDED FOR DAILY LIVING?: NO

## 2022-11-10 SDOH — ECONOMIC STABILITY: INCOME INSECURITY: HOW HARD IS IT FOR YOU TO PAY FOR THE VERY BASICS LIKE FOOD, HOUSING, MEDICAL CARE, AND HEATING?: NOT HARD AT ALL

## 2022-11-10 ASSESSMENT — SOCIAL DETERMINANTS OF HEALTH (SDOH)
HOW HARD IS IT FOR YOU TO PAY FOR THE VERY BASICS LIKE FOOD, HOUSING, MEDICAL CARE, AND HEATING?: NOT HARD AT ALL
HOW OFTEN DO YOU GET TOGETHER WITH FRIENDS OR RELATIVES?: ONCE A WEEK
DO YOU BELONG TO ANY CLUBS OR ORGANIZATIONS SUCH AS CHURCH GROUPS UNIONS, FRATERNAL OR ATHLETIC GROUPS, OR SCHOOL GROUPS?: NO
HOW MANY DRINKS CONTAINING ALCOHOL DO YOU HAVE ON A TYPICAL DAY WHEN YOU ARE DRINKING: 1 OR 2
HOW OFTEN DO YOU ATTEND CHURCH OR RELIGIOUS SERVICES?: NEVER
DO YOU BELONG TO ANY CLUBS OR ORGANIZATIONS SUCH AS CHURCH GROUPS UNIONS, FRATERNAL OR ATHLETIC GROUPS, OR SCHOOL GROUPS?: NO
IN A TYPICAL WEEK, HOW MANY TIMES DO YOU TALK ON THE PHONE WITH FAMILY, FRIENDS, OR NEIGHBORS?: MORE THAN THREE TIMES A WEEK
HOW OFTEN DO YOU ATTEND CHURCH OR RELIGIOUS SERVICES?: NEVER
HOW OFTEN DO YOU HAVE A DRINK CONTAINING ALCOHOL: MONTHLY OR LESS
HOW OFTEN DO YOU GET TOGETHER WITH FRIENDS OR RELATIVES?: ONCE A WEEK
HOW OFTEN DO YOU HAVE SIX OR MORE DRINKS ON ONE OCCASION: NEVER
IN A TYPICAL WEEK, HOW MANY TIMES DO YOU TALK ON THE PHONE WITH FAMILY, FRIENDS, OR NEIGHBORS?: MORE THAN THREE TIMES A WEEK
WITHIN THE PAST 12 MONTHS, YOU WORRIED THAT YOUR FOOD WOULD RUN OUT BEFORE YOU GOT THE MONEY TO BUY MORE: NEVER TRUE

## 2022-11-10 ASSESSMENT — LIFESTYLE VARIABLES
HOW OFTEN DO YOU HAVE SIX OR MORE DRINKS ON ONE OCCASION: NEVER
HOW OFTEN DO YOU HAVE A DRINK CONTAINING ALCOHOL: MONTHLY OR LESS
AUDIT-C TOTAL SCORE: 1
SKIP TO QUESTIONS 9-10: 1
HOW MANY STANDARD DRINKS CONTAINING ALCOHOL DO YOU HAVE ON A TYPICAL DAY: 1 OR 2

## 2022-11-10 ASSESSMENT — PATIENT HEALTH QUESTIONNAIRE - PHQ9
CLINICAL INTERPRETATION OF PHQ2 SCORE: 4
5. POOR APPETITE OR OVEREATING: 0 - NOT AT ALL
SUM OF ALL RESPONSES TO PHQ QUESTIONS 1-9: 14

## 2022-11-10 ASSESSMENT — ENCOUNTER SYMPTOMS: GENERAL WELL-BEING: FAIR

## 2022-11-10 ASSESSMENT — PAIN SCALES - GENERAL: PAINLEVEL: 5=MODERATE PAIN

## 2022-11-10 ASSESSMENT — ACTIVITIES OF DAILY LIVING (ADL): BATHING_REQUIRES_ASSISTANCE: 0

## 2022-11-10 NOTE — LETTER
November 10, 2022        Patient: Magdalena Ascencio   YOB: 1955   Date of Visit: 11/10/2022           To Whom It May Concern:    I would recommend Magdalena Ascencio telecommute 3 days a week due to a medical condition at this time. Thank you for your cooperation and understanding.       If you have any questions or concerns, please don't hesitate to call.        Sincerely,          Eileen Reno M.D.  Electronically Signed    [facility]  [address] [place] [phone] (Phone)  [phone] (Fax)

## 2022-11-11 NOTE — PROGRESS NOTES
Chief Complaint   Patient presents with    Annual Exam       HPI:  Magdalena is a 67 y.o. here for Annual Wellness Visit    Zoloft- anxiety more so than depression. Stable with medication, zoloft 25 mg daily.  Needs refill. No counseling therapy currently. Reviewed televisit options.   Struggles with work- retired at end of year. Anxiety morning- debilitating. Reviewed possible FMLA. Will be retired at end of year.   Hydrocortisone- for psoriasis. Stable.  Not eating well- time and energy limits her. Working currently.   Hx of hypertension- no current issues. Hx of IBD.   Fell on her way here right knee- can walk on it. Last step at home- slipped on leaves. Left wrist outstretched- currently without significant pain or swelling.  No head injury.   Smoking 1 ppd- does not tolerate wellbutrin - causes SI.   Elevated h/h on past labs, last levels improved.     Review of Systems   Constitutional: Negative for fever, chills and malaise/fatigue.   HENT: Negative for congestion.    Eyes: Negative for pain.   Respiratory: Negative for cough and shortness of breath.    Cardiovascular: Negative for leg swelling.   Gastrointestinal: Negative for nausea, vomiting, abdominal pain and diarrhea.   Genitourinary: Negative for dysuria and hematuria.   Skin: Negative for rash.   Neurological: Negative for dizziness, focal weakness and headaches.   Endo/Heme/Allergies: Does not bruise/bleed easily.   Psychiatric/Behavioral: as above.       Patient Active Problem List    Diagnosis Date Noted    Closed fracture of upper end of humerus 11/03/2021    Dislocation of shoulder joint 11/03/2021    Full thickness rotator cuff tear 11/03/2021    Marijuana use 02/27/2020    BMI 40.0-44.9, adult (HCC) 03/25/2016    Psoriasis 03/25/2016       Current Outpatient Medications   Medication Sig Dispense Refill    sertraline (ZOLOFT) 25 MG tablet Take 1 Tablet by mouth every day. 90 Tablet 1    sertraline (ZOLOFT) 25 MG tablet Take 25 mg by mouth every  day.      nystatin (MYCOSTATIN) powder Apply 1 g topically 3 times a day. (Patient not taking: Reported on 11/3/2021) 30 g 0    naproxen (NAPROSYN) 250 MG Tab Take 250-500 mg by mouth every four hours as needed (PAIN). (Patient not taking: Reported on 11/3/2021)       No current facility-administered medications for this visit.        Patient is taking medications as noted in medication list.  Current supplements as per medication list.     Allergies: Sulfa drugs    Current social contact/activities: Fiends and family     Is patient current with immunizations? Yes.    She  reports that she has been smoking cigarettes. She has a 20.00 pack-year smoking history. She has never used smokeless tobacco. She reports that she does not currently use alcohol after a past usage of about 1.8 oz per week. She reports that she does not currently use drugs after having used the following drugs: Marijuana and Inhaled. Frequency: 4.00 times per week.  Ready to quit: Not Answered  Counseling given: Not Answered      ROS:    Gait: Uses no assistive device   Ostomy: No   Other tubes: No   Amputations: No   Chronic oxygen use No   Last eye exam: 2 years ago   Wears hearing aids: No   : Denies any urinary leakage during the last 6 months    Depression Screening  Little interest or pleasure in doing things?  2 - more than half the days  Feeling down, depressed, or hopeless? 2 - more than half the days  Trouble falling or staying asleep, or sleeping too much?  3 - nearly every day  Feeling tired or having little energy?  3 - nearly every day  Poor appetite or overeating?  0 - not at all  Feeling bad about yourself - or that you are a failure or have let yourself or your family down? 3 - nearly every day  Trouble concentrating on things, such as reading the newspaper or watching television? 1 - several days  Moving or speaking so slowly that other people could have noticed.  Or the opposite - being so fidgety or restless that you have  been moving around a lot more than usual?  0 - not at all  Thoughts that you would be better off dead, or of hurting yourself?  0 - not at all  Patient Health Questionnaire Score: 14    If depressive symptoms identified deferred to follow up visit unless specifically addressed in assessment and plan.    Interpretation of PHQ-9 Total Score   Score Severity   1-4 No Depression   5-9 Mild Depression   10-14 Moderate Depression   15-19 Moderately Severe Depression   20-27 Severe Depression    Screening for Cognitive Impairment  Three Minute Recall (daughter, jesusita, ronel)  3/3 3  Draw clock face with all 12 numbers and set the hands to show 10 past 11.  Yes 5  If cognitive concerns identified, deferred for follow up unless specifically addressed in assessment and plan.    Fall Risk Assessment  Has the patient had two or more falls in the last year or any fall with injury in the last year?  Yes  If fall risk identified, deferred for follow up unless specifically addressed in assessment and plan.    Safety Assessment  Throw rugs on floor.  Yes  Handrails on all stairs.  Yes  Good lighting in all hallways.  Yes  Difficulty hearing.  No  Patient counseled about all safety risks that were identified.    Functional Assessment ADLs  Are there any barriers preventing you from cooking for yourself or meeting nutritional needs?  No.    Are there any barriers preventing you from driving safely or obtaining transportation?  No.    Are there any barriers preventing you from using a telephone or calling for help?  No.    Are there any barriers preventing you from shopping?  No.    Are there any barriers preventing you from taking care of your own finances?  No.    Are there any barriers preventing you from managing your medications?  No.    Are there any barriers preventing you from showering, bathing or dressing yourself?  No.    Are you currently engaging in any exercise or physical activity?  No.     What is your perception of  your health?  Fair.    Advance Care Planning  Do you have an Advance Directive, Living Will, Durable Power of , or POLST? Yes          is not on file - instructed patient to bring in a copy to scan into their chart    Health Maintenance Summary            Ordered - BONE DENSITY (Every 5 Years) Ordered on 11/23/2022      No completion history exists for this topic.              Ordered - HEPATITIS C SCREENING (Once) Ordered on 11/10/2022      No completion history exists for this topic.              Overdue - IMM PNEUMOCOCCAL VACCINE: 65+ Years (1 - PCV) Overdue - never done      No completion history exists for this topic.              Overdue - IMM DTaP/Tdap/Td Vaccine (1 - Tdap) Overdue - never done      No completion history exists for this topic.              Ordered - COLORECTAL CANCER SCREENING (COLONOSCOPY - Every 10 Years) Ordered on 11/23/2022      No completion history exists for this topic.              Overdue - IMM ZOSTER VACCINES (1 of 2) Overdue - never done      No completion history exists for this topic.              Ordered - MAMMOGRAM (Every 2 Years) Ordered on 11/23/2022 01/01/2006  Done - NL              Overdue - CERVICAL CANCER SCREENING (Every 3 Years) Overdue since 1/1/2009 01/01/2006  Done - NL              Overdue - COVID-19 Vaccine (4 - Booster for Moderna series) Overdue since 1/28/2022 12/03/2021  Outside Immunization: COVID-19 mRNA (MOD)    03/04/2021  Imm Admin: MODERNA SARS-COV-2 VACCINE (12+)    02/04/2021  Imm Admin: MODERNA SARS-COV-2 VACCINE (12+)              IMM HEP B VACCINE (Series Information) Completed      07/08/1999  Imm Admin: Hepatitis B Vaccine (Adol/Adult)    03/04/1999  Imm Admin: Hepatitis B Vaccine (Adol/Adult)    02/04/1999  Imm Admin: Hepatitis B Vaccine (Adol/Adult)              IMM INFLUENZA (Series Information) Completed      11/10/2022  Imm Admin: Influenza Vaccine Adult HD    10/08/2021  Imm Admin: Influenza Vaccine Quad Inj (Pf)     10/28/2020  Imm Admin: Influenza Vaccine Quad Inj (Pf)    2015  Imm Admin: Influenza Seasonal Injectable - Historical Data    2013  Imm Admin: Influenza (IM) Preservative Free - HISTORICAL DATA    Only the first 5 history entries have been loaded, but more history exists.              IMM MENINGOCOCCAL ACWY VACCINE (Series Information) Aged Out      No completion history exists for this topic.                    Patient Care Team:  Eileen Reno M.D. as PCP - General (Family Medicine)    Social History     Tobacco Use    Smoking status: Every Day     Packs/day: 1.00     Years: 20.00     Pack years: 20.00     Types: Cigarettes     Last attempt to quit: 3/1/2016     Years since quittin.7    Smokeless tobacco: Never   Vaping Use    Vaping Use: Never used   Substance Use Topics    Alcohol use: Not Currently     Alcohol/week: 1.8 oz     Types: 3 Standard drinks or equivalent per week     Comment: weekly     Drug use: Not Currently     Frequency: 4.0 times per week     Types: Marijuana, Inhaled     Comment: marijuana last used 10/27     Family History   Problem Relation Age of Onset    Dementia Mother     Cancer Father         brain Ca    Heart Attack Brother 45     She  has a past medical history of Blood transfusion without reported diagnosis, Hypertension, IBD (inflammatory bowel disease), Pain, Psychiatric problem, and Shingles.    She has no past medical history of Anxiety, Clotting disorder ([facility]), COPD (chronic obstructive pulmonary disease) ([facility]), Depression, Diabetic neuropathy ([facility]), GERD (gastroesophageal reflux disease), Goiter, Hyperlipidemia, Migraine, Muscle disorder, Osteoporosis, or Substance abuse ([facility]).   Past Surgical History:   Procedure Laterality Date    PB OPEN TREATMENT CLAVICULAR FRACTURE INTERNAL * Left 10/28/2021    Procedure: ORIF, FRACTURE, CLAVICLE - AND REPAIRS AS INDICATED;  Surgeon: Darryl Thomas M.D.;  Location: SURGERY [facility];  Service: Orthopedics    ORIF,  "FRACTURE, HUMERUS Left 10/28/2021    Procedure: ORIF, FRACTURE, HUMERUS - PROXIMAL;  Surgeon: Darryl Thomas M.D.;  Location: SURGERY [facility];  Service: Orthopedics    DILATION AND CURETTAGE  22s    TONSILLECTOMY  8 yoa       Exam:   /80 (BP Location: Left arm, Patient Position: Sitting, BP Cuff Size: Adult)   Pulse 80   Temp 36.6 °C (97.9 °F) (Temporal)   Resp 19   Ht 1.676 m (5' 6\")   Wt 118 kg (260 lb)   SpO2 97%  Body mass index is 41.97 kg/m².    Hearing good.    Dentition good  Alert, oriented in no acute distress  Eye contact is good, speech goal directed, affect calm  Constitutional: She appears well-developed and well-nourished. She appears not diaphoretic. No distress.   HENT: Right Ear: External ear normal. Left Ear: External ear normal. Tympanic membranes clear and intact.   Nose: Nose normal.   Mouth/Throat: Oropharynx is clear and moist. No oropharyngeal exudate.     Eyes: Conjunctivae and extraocular motions are normal. Pupils are equal, round, and reactive to light. No scleral icterus.   Neck: Normal range of motion. Neck supple. No thyromegaly present.   Cardiovascular: Normal rate, regular rhythm, normal heart sounds and intact distal pulses.  Exam reveals no gallop and no friction rub.  No murmur heard. No carotid bruits.   Pulmonary/Chest: Effort normal and breath sounds normal. No respiratory distress. She has no wheezes. She has no rales.   Abdominal: Soft. Bowel sounds are normal. She exhibits no distension and no mass. No tenderness. She has no rebound and no guarding.   Lymphadenopathy:  She has no cervical adenopathy.   Neurological: She is alert. She has normal reflexes. No cranial nerve deficit. She exhibits normal muscle tone.   Skin: Skin is warm and dry. No rash noted. She is not diaphoretic. No erythema.   Psychiatric: She has a normal mood and affect. Her behavior is normal.   Musculoskeletal: She exhibits no edema. Full strength throughout. 2+ DTR throughout. Right " knee with superficial abrasion.       Assessment and Plan. The following treatment and monitoring plan is recommended:      66 yo female for AWV.     1. Well adult exam   Recommend healthy diet and routine exercise.        2. Anxiety- current work stressors further contributing.   Continue current medication. Consider increase in zoloft to 50 mg as tolerated. Recommend routine self care activities and stress management. Recommend counseling therapy, consider virtual visits.  Monitor.  TSH WITH REFLEX TO FT4      3. Moderate episode of recurrent major depressive disorder (HCC) - as above.  sertraline (ZOLOFT) 25 MG tablet    Comp Metabolic Panel    TSH WITH REFLEX TO FT4      4. Psoriasis - stable, continue current medication. Monitor.  VITAMIN D,25 HYDROXY (DEFICIENCY)      5. Fall from ground level - slipped on leaves. Mild abrasions, otherwise stable. Monitor.   Fall precautions.        6. Smoking - counseled on smoking cessation. Did not tolerate wellbutrin. Patient to consider other options. Monitor.        7. Polycythemia - improved on last labs. Monitor.  CBC WITH DIFFERENTIAL      8. Post-menopausal  DS-BONE DENSITY STUDY (DEXA)      9. BMI 40.0-44.9, adult ([facility])   Patient's body mass index is 41.97 kg/m². Exercise and nutrition counseling were performed at this visit.       10. Screening, lipid  Lipid Profile      11. Screening for diabetes mellitus  HEMOGLOBIN A1C      12. Need for hepatitis C screening test  HEP C VIRUS ANTIBODY      13. Screen for colon cancer  Referral to GI for Colonoscopy      14. Encounter for screening mammogram for malignant neoplasm of breast  MA-SCREENING MAMMO BILAT W/TOMOSYNTHESIS W/CAD      15. Need for immunization against influenza  INFLUENZA VACCINE, HIGH DOSE (65+ ONLY)      16.    Stress - as above.     Services suggested: No services needed at this time  Health Care Screening recommendations as per orders if indicated.  Referrals offered: PT/OT/Nutrition  counseling/Behavioral Health/Smoking cessation as per orders if indicated.    Discussion today about general wellness and lifestyle habits:    Prevent falls and reduce trip hazards; Cautioned about securing or removing rugs.  Have a working fire alarm and carbon monoxide detector;   Engage in regular physical activity and social activities.     Follow-up: Return in about 1 month (around 12/10/2022).    Please note that this dictation was created using voice recognition software. I have worked with consultants from the vendor as well as technical experts from [facility] [employer] [place] to optimize the interface. I have made every reasonable attempt to correct obvious errors, but I expect that there are errors of grammar and possibly content I did not discover before finalizing the note.

## 2022-12-15 ENCOUNTER — OFFICE VISIT (OUTPATIENT)
Dept: MEDICAL GROUP | Facility: IMAGING CENTER | Age: 67
End: 2022-12-15
Payer: COMMERCIAL

## 2022-12-15 VITALS
TEMPERATURE: 97.2 F | HEART RATE: 70 BPM | RESPIRATION RATE: 18 BRPM | HEIGHT: 66 IN | BODY MASS INDEX: 40.18 KG/M2 | SYSTOLIC BLOOD PRESSURE: 110 MMHG | OXYGEN SATURATION: 97 % | DIASTOLIC BLOOD PRESSURE: 70 MMHG | WEIGHT: 250 LBS
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
## 2022-12-15 DIAGNOSIS — Z98.890 HISTORY OF ORTHOPEDIC SURGERY: ICD-10-CM

## 2022-12-15 DIAGNOSIS — M79.602 MUSCULOSKELETAL PAIN OF LEFT UPPER EXTREMITY: ICD-10-CM

## 2022-12-15 PROCEDURE — 99213 OFFICE O/P EST LOW 20 MIN: CPT | Performed by: FAMILY MEDICINE

## 2022-12-15 ASSESSMENT — PAIN SCALES - GENERAL: PAINLEVEL: NO PAIN

## 2022-12-15 NOTE — PROGRESS NOTES
SUBJECTIVE:    Chief Complaint   Patient presents with    Follow-Up     Pt states her leg is doing a lot better from the fall she had a couple of weeks ago. Her left arm has not gotten better and she states it won't bear weight.        HPI:     Magdalena Ascencio is a 67 y.o. female with past history of left upper extremity surgery here for follow up after prior fall after slipping on leave on step at home.   Overall feeling better with right lower extremity. Feels she did have a calf strain previously which is better, used crutches for a period.     Left arm- continue to hurt where she had surgeries. Popping more since her fall.   Landed on her left side, wrist area.   Mobility is better overall.   Orif of humerus and surgery on clavicle area.   Pain mostly on radial side of upper extremity.   Got flu as knee was getting better. Better now.  Will complete labs and dexa in future.   She will plan to go to pharmacy for vaccine.   Colonoscopy- GI referral previously done.      ROS:  No recent fevers or chills. No nausea or vomiting. No diarrhea. No chest pains or shortness of breath. No lower extremity edema.    Current Outpatient Medications on File Prior to Visit   Medication Sig Dispense Refill    sertraline (ZOLOFT) 25 MG tablet Take 1 Tablet by mouth every day. 90 Tablet 1    nystatin (MYCOSTATIN) powder Apply 1 g topically 3 times a day. 30 g 0     No current facility-administered medications on file prior to visit.       Allergies   Allergen Reactions    Sulfa Drugs Rash     Minor rash       Patient Active Problem List    Diagnosis Date Noted    Closed fracture of upper end of humerus 11/03/2021    Dislocation of shoulder joint 11/03/2021    Full thickness rotator cuff tear 11/03/2021    Marijuana use 02/27/2020    BMI 40.0-44.9, adult ([facility]) 03/25/2016    Psoriasis 03/25/2016       Past Medical History:   Diagnosis Date    Blood transfusion without reported diagnosis     in her tonsilectomy at age 8     "Hypertension     in history, thought stressed realted, hx of white coat syndrome    IBD (inflammatory bowel disease)     maybe mild IBS occasionally    Pain     arm 7-10-/10    Psychiatric problem     depression in the past after death of family    Shingles          OBJECTIVE:   /70 (BP Location: Left arm, Patient Position: Sitting, BP Cuff Size: Adult)   Pulse 70   Temp 36.2 °C (97.2 °F) (Temporal)   Resp 18   Ht 1.676 m (5' 6\")   Wt 113 kg (250 lb)   SpO2 97%   BMI 40.35 kg/m²   General: Well-developed well-nourished female, no acute distress  Neck: supple, no lymphadenopathy- cervical or supraclavicular, no thyromegaly. No spinal TTP , full ROM.   Cardiovascular: regular rate and rhythm, no murmurs, gallops, rubs  Lungs: clear to auscultation bilaterally, no wheezes, crackles, or rhonchi  Extremities: no cyanosis, clubbing, edema. 2+ DTR throughout left upper extremity, adequate ROM and strength, + speed's and yergason's test, mild TTP of left anterior and lateral shoulder region.  Without discomfort with ER, IR and empty can test again resistance.   Skin: Warm and dry  Psych: appropriate mood and affect      ASSESSMENT/PLAN:    67 y.o.female       1. Musculoskeletal pain of left upper extremity- hx of surgery of area. Component due to biceps.   Will assess further with imaging.   If stable on xray, recommend physical therapy. Modify activities. Monitor.  DX-SHOULDER 2+ LEFT   2.       History of orthopedic surgery of left upper extremities.  DX-HUMERUS 2+ LEFT    Referral to Physical Therapy      3. Fall from ground level  Referral to Physical Therapy      Plan for future labs and dexa.     Return in about 6 weeks (around 1/26/2023) for 6-8 weeks.    This medical record contains text that has been entered with the assistance of computer voice recognition and dictation software.  Therefore, it may contain unintended errors in text, spelling, punctuation, or grammar.                            "

## 2024-11-06 ENCOUNTER — APPOINTMENT (OUTPATIENT)
Dept: RADIOLOGY | Facility: MEDICAL CENTER | Age: 69
End: 2024-11-06
Attending: EMERGENCY MEDICINE
Payer: MEDICARE

## 2024-11-06 ENCOUNTER — HOSPITAL ENCOUNTER (OUTPATIENT)
Dept: RADIOLOGY | Facility: MEDICAL CENTER | Age: 69
End: 2024-11-06

## 2024-11-06 ENCOUNTER — PHARMACY VISIT (OUTPATIENT)
Dept: PHARMACY | Facility: MEDICAL CENTER | Age: 69
End: 2024-11-06
Payer: COMMERCIAL

## 2024-11-06 ENCOUNTER — HOSPITAL ENCOUNTER (EMERGENCY)
Facility: MEDICAL CENTER | Age: 69
End: 2024-11-06
Attending: EMERGENCY MEDICINE | Admitting: EMERGENCY MEDICINE
Payer: MEDICARE

## 2024-11-06 VITALS
DIASTOLIC BLOOD PRESSURE: 73 MMHG | SYSTOLIC BLOOD PRESSURE: 125 MMHG | HEART RATE: 63 BPM | OXYGEN SATURATION: 96 % | WEIGHT: 230 LBS | HEIGHT: 66 IN | BODY MASS INDEX: 36.96 KG/M2 | TEMPERATURE: 97.1 F | RESPIRATION RATE: 20 BRPM

## 2024-11-06 DIAGNOSIS — Z71.6 TOBACCO ABUSE COUNSELING: ICD-10-CM

## 2024-11-06 DIAGNOSIS — F41.9 ANXIETY: ICD-10-CM

## 2024-11-06 DIAGNOSIS — H53.8 BLURRY VISION, LEFT EYE: ICD-10-CM

## 2024-11-06 DIAGNOSIS — H53.9 VISION CHANGES: ICD-10-CM

## 2024-11-06 DIAGNOSIS — M62.838 MASSETER MUSCLE SPASM: ICD-10-CM

## 2024-11-06 DIAGNOSIS — Z72.0 TOBACCO ABUSE: ICD-10-CM

## 2024-11-06 LAB
ALBUMIN SERPL BCP-MCNC: 3.9 G/DL (ref 3.2–4.9)
ALBUMIN/GLOB SERPL: 1.2 G/DL
ALP SERPL-CCNC: 90 U/L (ref 30–99)
ALT SERPL-CCNC: 38 U/L (ref 2–50)
ANION GAP SERPL CALC-SCNC: 16 MMOL/L (ref 7–16)
APTT PPP: 27.6 SEC (ref 24.7–36)
AST SERPL-CCNC: 51 U/L (ref 12–45)
BASOPHILS # BLD AUTO: 0.1 % (ref 0–1.8)
BASOPHILS # BLD: 0.01 K/UL (ref 0–0.12)
BILIRUB SERPL-MCNC: 0.5 MG/DL (ref 0.1–1.5)
BUN SERPL-MCNC: 11 MG/DL (ref 8–22)
CALCIUM ALBUM COR SERPL-MCNC: 9.7 MG/DL (ref 8.5–10.5)
CALCIUM SERPL-MCNC: 9.6 MG/DL (ref 8.5–10.5)
CHLORIDE SERPL-SCNC: 105 MMOL/L (ref 96–112)
CO2 SERPL-SCNC: 18 MMOL/L (ref 20–33)
CREAT SERPL-MCNC: 0.7 MG/DL (ref 0.5–1.4)
EOSINOPHIL # BLD AUTO: 0.01 K/UL (ref 0–0.51)
EOSINOPHIL NFR BLD: 0.1 % (ref 0–6.9)
ERYTHROCYTE [DISTWIDTH] IN BLOOD BY AUTOMATED COUNT: 47.7 FL (ref 35.9–50)
GFR SERPLBLD CREATININE-BSD FMLA CKD-EPI: 95 ML/MIN/1.73 M 2
GLOBULIN SER CALC-MCNC: 3.3 G/DL (ref 1.9–3.5)
GLUCOSE SERPL-MCNC: 156 MG/DL (ref 65–99)
HCT VFR BLD AUTO: 46.9 % (ref 37–47)
HGB BLD-MCNC: 15.3 G/DL (ref 12–16)
IMM GRANULOCYTES # BLD AUTO: 0.04 K/UL (ref 0–0.11)
IMM GRANULOCYTES NFR BLD AUTO: 0.4 % (ref 0–0.9)
INR PPP: 1.05 (ref 0.87–1.13)
LYMPHOCYTES # BLD AUTO: 0.59 K/UL (ref 1–4.8)
LYMPHOCYTES NFR BLD: 6.5 % (ref 22–41)
MCH RBC QN AUTO: 29.9 PG (ref 27–33)
MCHC RBC AUTO-ENTMCNC: 32.6 G/DL (ref 32.2–35.5)
MCV RBC AUTO: 91.8 FL (ref 81.4–97.8)
MONOCYTES # BLD AUTO: 0.05 K/UL (ref 0–0.85)
MONOCYTES NFR BLD AUTO: 0.6 % (ref 0–13.4)
NEUTROPHILS # BLD AUTO: 8.35 K/UL (ref 1.82–7.42)
NEUTROPHILS NFR BLD: 92.3 % (ref 44–72)
NRBC # BLD AUTO: 0 K/UL
NRBC BLD-RTO: 0 /100 WBC (ref 0–0.2)
PLATELET # BLD AUTO: 350 K/UL (ref 164–446)
PMV BLD AUTO: 9.4 FL (ref 9–12.9)
POTASSIUM SERPL-SCNC: 4.2 MMOL/L (ref 3.6–5.5)
PROT SERPL-MCNC: 7.2 G/DL (ref 6–8.2)
PROTHROMBIN TIME: 13.8 SEC (ref 12–14.6)
RBC # BLD AUTO: 5.11 M/UL (ref 4.2–5.4)
SODIUM SERPL-SCNC: 139 MMOL/L (ref 135–145)
WBC # BLD AUTO: 9.1 K/UL (ref 4.8–10.8)

## 2024-11-06 PROCEDURE — 700111 HCHG RX REV CODE 636 W/ 250 OVERRIDE (IP): Mod: JZ | Performed by: EMERGENCY MEDICINE

## 2024-11-06 PROCEDURE — 36415 COLL VENOUS BLD VENIPUNCTURE: CPT

## 2024-11-06 PROCEDURE — 85730 THROMBOPLASTIN TIME PARTIAL: CPT

## 2024-11-06 PROCEDURE — 85610 PROTHROMBIN TIME: CPT

## 2024-11-06 PROCEDURE — 96374 THER/PROPH/DIAG INJ IV PUSH: CPT | Mod: XU

## 2024-11-06 PROCEDURE — RXMED WILLOW AMBULATORY MEDICATION CHARGE: Performed by: EMERGENCY MEDICINE

## 2024-11-06 PROCEDURE — 70496 CT ANGIOGRAPHY HEAD: CPT

## 2024-11-06 PROCEDURE — 80053 COMPREHEN METABOLIC PANEL: CPT

## 2024-11-06 PROCEDURE — 99284 EMERGENCY DEPT VISIT MOD MDM: CPT

## 2024-11-06 PROCEDURE — 700117 HCHG RX CONTRAST REV CODE 255: Performed by: EMERGENCY MEDICINE

## 2024-11-06 PROCEDURE — 70498 CT ANGIOGRAPHY NECK: CPT

## 2024-11-06 PROCEDURE — 85025 COMPLETE CBC W/AUTO DIFF WBC: CPT

## 2024-11-06 RX ORDER — DIAZEPAM 10 MG/2ML
5 INJECTION, SOLUTION INTRAMUSCULAR; INTRAVENOUS ONCE
Status: COMPLETED | OUTPATIENT
Start: 2024-11-06 | End: 2024-11-06

## 2024-11-06 RX ORDER — DIAZEPAM 2 MG/1
2 TABLET ORAL EVERY 6 HOURS PRN
Qty: 20 TABLET | Refills: 0 | Status: SHIPPED | OUTPATIENT
Start: 2024-11-06 | End: 2024-11-11

## 2024-11-06 RX ADMIN — IOHEXOL 80 ML: 350 INJECTION, SOLUTION INTRAVENOUS at 20:00

## 2024-11-06 RX ADMIN — DIAZEPAM 5 MG: 5 INJECTION, SOLUTION INTRAMUSCULAR; INTRAVENOUS at 19:17

## 2024-11-07 NOTE — DISCHARGE INSTRUCTIONS
Rest tonight, follow-up with ophthalmology tomorrow morning at 7:30 AM, please do not be late for your appointment.  Best of luck with everything, I hope your vision gets back to normal  Stop smoking!!!

## 2024-11-07 NOTE — ED PROVIDER NOTES
"ER Provider Note    Scribed for Dr. Niya Owens D.O. by Rita Rodriguez. 11/6/2024  5:37 PM    Primary Care Provider: Eileen Reno M.D.    CHIEF COMPLAINT  Chief Complaint   Patient presents with    Blurred Vision     Pt transferred from [facility] for bilateral blurred vision that began 2 weeks ago, pt describes vision as a \"gray screen is over her eyes\" pt denies any other medical complaints       EXTERNAL RECORDS REVIEWED  Outpatient notes from [facility]    HPI/ROS  LIMITATION TO HISTORY   Select: : None    OUTSIDE HISTORIAN(S):  None    Magdalena Ascencio is a 69 y.o. female who presents to the ED for vision changes onset two weeks ago. She states that when onset began she noticed muscles aches throughout her body but primarily in her head and neck area, adding that she had difficulty opening her jaw. The patient reports that she sees \"two dark spots\" in her right peripheral vision. She notes that around one week ago she began to have central vision changes, stating that she will have \"blank spots\" in her vision.  She also describes a gray curtain type vision over her left eye.  The patient was concerned about her vision changes which prompted her to report to  [facility] for evaluation. She adds that she underwent an MRI which came back negative for a stroke, but due to her jaw and head pain the physician was concerned about an inflamed artery. The patient was given steroids and was transferred to the ED for further evaluation. She denies seeing an ophthalmologist recently for her symptoms. She denies a history of strokes, heart problems or being diagnosed with diabetes. The patient reports that she does smoke a pack a day, but denies consuming alcohol recently.       PAST MEDICAL HISTORY  Past Medical History:   Diagnosis Date    Blood transfusion without reported diagnosis     in her tonsilectomy at age 8    Hypertension     in history, thought stressed realted, hx of " "white coat syndrome    IBD (inflammatory bowel disease)     maybe mild IBS occasionally    Pain     arm 7-10-/10    Psychiatric problem     depression in the past after death of family    Shingles        SURGICAL HISTORY  Past Surgical History:   Procedure Laterality Date    PB OPEN TREATMENT CLAVICULAR FRACTURE INTERNAL * Left 10/28/2021    Procedure: ORIF, FRACTURE, CLAVICLE - AND REPAIRS AS INDICATED;  Surgeon: Darryl Thomas M.D.;  Location: SURGERY [facility];  Service: Orthopedics    ORIF, FRACTURE, HUMERUS Left 10/28/2021    Procedure: ORIF, FRACTURE, HUMERUS - PROXIMAL;  Surgeon: Darryl Thomas M.D.;  Location: SURGERY [facility];  Service: Orthopedics    DILATION AND CURETTAGE  22s    TONSILLECTOMY  8 yoa       FAMILY HISTORY  Family History   Problem Relation Age of Onset    Dementia Mother     Cancer Father         brain Ca    Heart Attack Brother 45       SOCIAL HISTORY   reports that she has been smoking cigarettes. She started smoking about 28 years ago. She has a 20 pack-year smoking history. She has never used smokeless tobacco. She reports that she does not currently use alcohol after a past usage of about 1.8 oz of alcohol per week. She reports that she does not currently use drugs after having used the following drugs: Marijuana and Inhaled. Frequency: 4.00 times per week.    CURRENT MEDICATIONS  Discharge Medication List as of 11/6/2024  8:37 PM        CONTINUE these medications which have NOT CHANGED    Details   sertraline (ZOLOFT) 25 MG tablet TAKE 1 TABLET BY MOUTH EVERY DAY, Disp-90 Tablet, R-0, Normal      nystatin (MYCOSTATIN) powder Apply 1 g topically 3 times a day., Disp-30 g, R-0, Normal             ALLERGIES  Sulfa drugs    PHYSICAL EXAM  BP (!) 173/75   Pulse 77   Temp 36.6 °C (97.8 °F) (Oral)   Resp 20   Ht 1.676 m (5' 6\")   Wt 104 kg (230 lb)   SpO2 94%   BMI 37.12 kg/m²   Constitutional: Patient is well developed, well nourished.  No acute distress. Pleasant. Morbidly " obese. Strong odor of tobacco. Emotional and tearful.   HENT: Normocephalic, atraumatic.  Nose normal with no mucosal edema or drainage. Oropharynx moist without erythema or exudates.  Eyes: PERRL, EOMI, Conjunctiva without erythema   Neck: Supple with no anterior/posterior cervical adenopathy. Normal range of motion in flexion, extension and lateral rotation. No tenderness along the bony prominences or paraspinal muscles.   Cardiovascular: Normal heart rate and Regular rhythm. No murmur,   Thorax & Lungs: Clear and equal breath sounds with good excursion. No respiratory distress, no rhonchi, wheezing or rales.   Abdomen: Obese. Bowel sounds normal in all four quadrants. Soft,nontender, no rebound , guarding, palpable masses.   Skin: Warm, Dry, no rashes.  Extremities: Peripheral pulses 4/4 No edema, No tenderness, normal range of motion.  Musculoskeletal: Normal range of motion in all major joints. No tenderness to palpation or major deformities noted.   Neurologic: Alert & oriented x 3, Normal motor function, Normal sensory function, No facial droop, no slurred speech, equal  strength bilaterally. Normal biceps triceps, Normal dorsi plantar flexion, Subjective vision loss on the right, Grey haziness in visual field on the left. DTR's 4/4 bilaterally.  Psychiatric: Affect normal, Judgment normal, Mood normal.       DIAGNOSTIC STUDIES & PROCEDURES    Labs:   Results for orders placed or performed during the hospital encounter of 11/06/24   CBC WITH DIFFERENTIAL    Collection Time: 11/06/24  6:22 PM   Result Value Ref Range    WBC 9.1 4.8 - 10.8 K/uL    RBC 5.11 4.20 - 5.40 M/uL    Hemoglobin 15.3 12.0 - 16.0 g/dL    Hematocrit 46.9 37.0 - 47.0 %    MCV 91.8 81.4 - 97.8 fL    MCH 29.9 27.0 - 33.0 pg    MCHC 32.6 32.2 - 35.5 g/dL    RDW 47.7 35.9 - 50.0 fL    Platelet Count 350 164 - 446 K/uL    MPV 9.4 9.0 - 12.9 fL    Neutrophils-Polys 92.30 (H) 44.00 - 72.00 %    Lymphocytes 6.50 (L) 22.00 - 41.00 %     Monocytes 0.60 0.00 - 13.40 %    Eosinophils 0.10 0.00 - 6.90 %    Basophils 0.10 0.00 - 1.80 %    Immature Granulocytes 0.40 0.00 - 0.90 %    Nucleated RBC 0.00 0.00 - 0.20 /100 WBC    Neutrophils (Absolute) 8.35 (H) 1.82 - 7.42 K/uL    Lymphs (Absolute) 0.59 (L) 1.00 - 4.80 K/uL    Monos (Absolute) 0.05 0.00 - 0.85 K/uL    Eos (Absolute) 0.01 0.00 - 0.51 K/uL    Baso (Absolute) 0.01 0.00 - 0.12 K/uL    Immature Granulocytes (abs) 0.04 0.00 - 0.11 K/uL    NRBC (Absolute) 0.00 K/uL   COMP METABOLIC PANEL    Collection Time: 11/06/24  6:22 PM   Result Value Ref Range    Sodium 139 135 - 145 mmol/L    Potassium 4.2 3.6 - 5.5 mmol/L    Chloride 105 96 - 112 mmol/L    Co2 18 (L) 20 - 33 mmol/L    Anion Gap 16.0 7.0 - 16.0    Glucose 156 (H) 65 - 99 mg/dL    Bun 11 8 - 22 mg/dL    Creatinine 0.70 0.50 - 1.40 mg/dL    Calcium 9.6 8.5 - 10.5 mg/dL    Correct Calcium 9.7 8.5 - 10.5 mg/dL    AST(SGOT) 51 (H) 12 - 45 U/L    ALT(SGPT) 38 2 - 50 U/L    Alkaline Phosphatase 90 30 - 99 U/L    Total Bilirubin 0.5 0.1 - 1.5 mg/dL    Albumin 3.9 3.2 - 4.9 g/dL    Total Protein 7.2 6.0 - 8.2 g/dL    Globulin 3.3 1.9 - 3.5 g/dL    A-G Ratio 1.2 g/dL   APTT    Collection Time: 11/06/24  6:22 PM   Result Value Ref Range    APTT 27.6 24.7 - 36.0 sec   PROTHROMBIN TIME (INR)    Collection Time: 11/06/24  6:22 PM   Result Value Ref Range    PT 13.8 12.0 - 14.6 sec    INR 1.05 0.87 - 1.13   ESTIMATED GFR    Collection Time: 11/06/24  6:22 PM   Result Value Ref Range    GFR (CKD-EPI) 95 >60 mL/min/1.73 m 2      All labs reviewed by me.    Radiology:   The attending Emergency Physician has independently interpreted the diagnostic imaging associated with this visit and is awaiting the final reading from the radiologist, which will be displayed below.    Preliminary interpretation is a follows: Normal CT workup  Radiologist interpretation:    CT-CTA NECK WITH & W/O-POST PROCESSING   Final Result      CT angiogram of the neck within normal  limits.      CT-CTA HEAD WITH & W/O-POST PROCESS   Final Result      CT angiogram of the [place] within normal limits.      MR-OUTSIDE IMAGES-MR BRAIN   Final Result      MR-OUTSIDE IMAGES-MR BRAIN    (Results Pending)        COURSE & MEDICAL DECISION MAKING    INITIAL ASSESSMENT AND PLAN  Care Narrative:       5:37 PM - Patient seen and evaluated at bedside for vision changes onset two weeks ago. Discussed the plan of care with the patient including  ordering labs and imaging to further evaluate the patient's condition. The patient was able to ask questions at this time. Patient agrees to plan of care. Ordered CT-CTA neck with and without post processing, CT-CTA head with and without post processing, CBC w/ diff, CMP, APTT and Prothrombin time to evaluate. Differential diagnoses include but are not limited to: Acute stroke vs ischemic stroke vs central retinal artery occulusion.    Labs all came back unremarkable.    6:01 PM - Paged Ophthalmology. Patient will be treated with Valium 5 mg.     6:03 PM - I discussed the patient's case and the above findings with Dr. Sheehan (Ophtho) who agreed on the patient's workup, agreed that the patient does not have temporal arteritis and also agreed to see the patient in the office at [facility] tomorrow at 7:30 AM for further evaluation.   Patient's stroke workup with CTA head and neck and perfusion was negative, everything was discussed with the patient and her family and the plan will be to have her follow-up tomorrow morning with ophthalmology at 7:30 AM.  She is return if any change or worsening in her condition in the interim.    7:50 PM - I reevaluated the patient at bedside. The patient informs me they feel improved following Valium administration. Patient has no other recurrent or worsening symptoms. Patient notes that the Valium has helped with her jaw tightness, and I informed her that I will prescribe some Valium to her upon discharge. I also  informed the patient about my discussion with Dr. Sheehan and recommend that they follow up with them tomorrow. I discussed the patient's diagnostic study results with the patient at this time. I discussed plan for discharge and follow up as outlined below. The patient is stable for discharge at this time and will return for any new or worsening symptoms. Patient verbalizes understanding and support with my plan for discharge.                       DISPOSITION AND DISCUSSIONS  I have discussed management of the patient with the following physicians and HARRIET's: Dr. Sheehan ([facility]),    Discussion of management with other [facility] [place] [facility] or appropriate source(s): None     Escalation of care considered, and ultimately not performed: acute inpatient care management, however at this time, the patient is most appropriate for outpatient management.    Barriers to care at this time, including but not limited to:  None present .     Decision tools and prescription drugs considered including, but not limited to:  Valium .    The patient will return for new or worsening symptoms and is stable at the time of discharge.    DISPOSITION:  Patient will be discharged home in stable condition.    FOLLOW UP:  Hasmukh Sheehan M.D.  [address] [place] [id number]  [phone]    Go to   Appointment at 7:30 AM tomorrow morning      OUTPATIENT MEDICATIONS:  Discharge Medication List as of 11/6/2024  8:37 PM        START taking these medications    Details   diazePAM (VALIUM) 2 MG Tab Take 1 Tablet by mouth every 6 hours as needed for Anxiety for up to 5 days., Disp-20 Tablet, R-0, Normal           FINAL IMPRESSION   1. Blurry vision, left eye    2. Vision changes    3. Masseter muscle spasm    4. Anxiety    5. Tobacco abuse    6. Tobacco abuse counseling       Rita RAPHAEL (Olivia), am scribing for, and in the presence of, Niya Owens D.O..    Electronically signed by: Rita Ladd), 11/6/2024    Niya RAPHAEL D.O. personally  performed the services described in this documentation, as scribed by Rita Rodriguez in my presence, and it is both accurate and complete.    The note accurately reflects work and decisions made by me.  Niya Owens D.O.  11/7/2024  12:46 AM

## 2024-11-07 NOTE — ED TRIAGE NOTES
"Chief Complaint   Patient presents with    Blurred Vision     Pt transferred from [facility] for bilateral blurred vision that began 2 weeks ago, pt describes vision as a \"gray screen is over her eyes\" pt denies any other medical complaints       BIB [facility] from [facility] for above complaint pt states two weeks ago the blurred vision began after she woke up one day and her head and jaw were rigid and it was painful to swallow, pt waited a week and took naproxen to treat and the rigidity subsided but then her vision became blurred. Pt states she can see but the vision is clouded and she sees spots. Hx of HTN           BP (!) 173/75   Pulse 77   Temp 36.6 °C (97.8 °F) (Oral)   Resp 20   Ht 1.676 m (5' 6\")   Wt 104 kg (230 lb)   SpO2 94%   BMI 37.12 kg/m²     "

## 2025-01-10 ENCOUNTER — PATIENT OUTREACH (OUTPATIENT)
Dept: HEALTH INFORMATION MANAGEMENT | Facility: OTHER | Age: 70
End: 2025-01-10

## 2025-03-19 SDOH — HEALTH STABILITY: PHYSICAL HEALTH: ON AVERAGE, HOW MANY DAYS PER WEEK DO YOU ENGAGE IN MODERATE TO STRENUOUS EXERCISE (LIKE A BRISK WALK)?: 3 DAYS

## 2025-03-19 SDOH — ECONOMIC STABILITY: INCOME INSECURITY: IN THE LAST 12 MONTHS, WAS THERE A TIME WHEN YOU WERE NOT ABLE TO PAY THE MORTGAGE OR RENT ON TIME?: NO

## 2025-03-19 SDOH — ECONOMIC STABILITY: FOOD INSECURITY: WITHIN THE PAST 12 MONTHS, THE FOOD YOU BOUGHT JUST DIDN'T LAST AND YOU DIDN'T HAVE MONEY TO GET MORE.: NEVER TRUE

## 2025-03-19 SDOH — ECONOMIC STABILITY: INCOME INSECURITY: HOW HARD IS IT FOR YOU TO PAY FOR THE VERY BASICS LIKE FOOD, HOUSING, MEDICAL CARE, AND HEATING?: NOT HARD AT ALL

## 2025-03-19 SDOH — ECONOMIC STABILITY: FOOD INSECURITY: WITHIN THE PAST 12 MONTHS, YOU WORRIED THAT YOUR FOOD WOULD RUN OUT BEFORE YOU GOT MONEY TO BUY MORE.: NEVER TRUE

## 2025-03-19 SDOH — HEALTH STABILITY: PHYSICAL HEALTH: ON AVERAGE, HOW MANY MINUTES DO YOU ENGAGE IN EXERCISE AT THIS LEVEL?: 20 MIN

## 2025-03-19 ASSESSMENT — SOCIAL DETERMINANTS OF HEALTH (SDOH)
DO YOU BELONG TO ANY CLUBS OR ORGANIZATIONS SUCH AS CHURCH GROUPS UNIONS, FRATERNAL OR ATHLETIC GROUPS, OR SCHOOL GROUPS?: NO
HOW OFTEN DO YOU GET TOGETHER WITH FRIENDS OR RELATIVES?: ONCE A WEEK
WITHIN THE PAST 12 MONTHS, YOU WORRIED THAT YOUR FOOD WOULD RUN OUT BEFORE YOU GOT THE MONEY TO BUY MORE: NEVER TRUE
HOW HARD IS IT FOR YOU TO PAY FOR THE VERY BASICS LIKE FOOD, HOUSING, MEDICAL CARE, AND HEATING?: NOT HARD AT ALL
HOW MANY DRINKS CONTAINING ALCOHOL DO YOU HAVE ON A TYPICAL DAY WHEN YOU ARE DRINKING: 1 OR 2
IN THE PAST 12 MONTHS, HAS THE ELECTRIC, GAS, OIL, OR WATER COMPANY THREATENED TO SHUT OFF SERVICE IN YOUR HOME?: NO
IN A TYPICAL WEEK, HOW MANY TIMES DO YOU TALK ON THE PHONE WITH FAMILY, FRIENDS, OR NEIGHBORS?: MORE THAN THREE TIMES A WEEK
IN A TYPICAL WEEK, HOW MANY TIMES DO YOU TALK ON THE PHONE WITH FAMILY, FRIENDS, OR NEIGHBORS?: MORE THAN THREE TIMES A WEEK
HOW OFTEN DO YOU ATTEND CHURCH OR RELIGIOUS SERVICES?: NEVER
HOW OFTEN DO YOU HAVE A DRINK CONTAINING ALCOHOL: MONTHLY OR LESS
HOW OFTEN DO YOU GET TOGETHER WITH FRIENDS OR RELATIVES?: ONCE A WEEK
HOW OFTEN DO YOU HAVE SIX OR MORE DRINKS ON ONE OCCASION: NEVER
DO YOU BELONG TO ANY CLUBS OR ORGANIZATIONS SUCH AS CHURCH GROUPS UNIONS, FRATERNAL OR ATHLETIC GROUPS, OR SCHOOL GROUPS?: NO
HOW OFTEN DO YOU ATTEND CHURCH OR RELIGIOUS SERVICES?: NEVER

## 2025-03-19 ASSESSMENT — LIFESTYLE VARIABLES
AUDIT-C TOTAL SCORE: 1
HOW MANY STANDARD DRINKS CONTAINING ALCOHOL DO YOU HAVE ON A TYPICAL DAY: 1 OR 2
HOW OFTEN DO YOU HAVE A DRINK CONTAINING ALCOHOL: MONTHLY OR LESS
HOW OFTEN DO YOU HAVE SIX OR MORE DRINKS ON ONE OCCASION: NEVER
SKIP TO QUESTIONS 9-10: 1

## 2025-03-20 ENCOUNTER — OFFICE VISIT (OUTPATIENT)
Dept: MEDICAL GROUP | Facility: IMAGING CENTER | Age: 70
End: 2025-03-20
Payer: MEDICARE

## 2025-03-20 VITALS
TEMPERATURE: 98.1 F | WEIGHT: 208.8 LBS | HEIGHT: 66 IN | OXYGEN SATURATION: 98 % | HEART RATE: 67 BPM | SYSTOLIC BLOOD PRESSURE: 124 MMHG | DIASTOLIC BLOOD PRESSURE: 70 MMHG | BODY MASS INDEX: 33.56 KG/M2 | RESPIRATION RATE: 14 BRPM

## 2025-03-20 DIAGNOSIS — F17.200 SMOKING: ICD-10-CM

## 2025-03-20 DIAGNOSIS — R74.01 ELEVATED ALT MEASUREMENT: ICD-10-CM

## 2025-03-20 DIAGNOSIS — R73.09 ELEVATED GLUCOSE LEVEL: ICD-10-CM

## 2025-03-20 DIAGNOSIS — Z23 NEED FOR PNEUMOCOCCAL VACCINATION: ICD-10-CM

## 2025-03-20 DIAGNOSIS — L98.9 SKIN LESION OF FACE: ICD-10-CM

## 2025-03-20 DIAGNOSIS — Z11.59 NEED FOR HEPATITIS C SCREENING TEST: ICD-10-CM

## 2025-03-20 DIAGNOSIS — Z12.83 SKIN CANCER SCREENING: ICD-10-CM

## 2025-03-20 DIAGNOSIS — Z00.00 MEDICARE ANNUAL WELLNESS VISIT, INITIAL: ICD-10-CM

## 2025-03-20 DIAGNOSIS — L30.4 INTERTRIGO: ICD-10-CM

## 2025-03-20 DIAGNOSIS — Z78.0 POSTMENOPAUSAL: ICD-10-CM

## 2025-03-20 DIAGNOSIS — Z12.11 SCREEN FOR COLON CANCER: ICD-10-CM

## 2025-03-20 DIAGNOSIS — Z12.31 ENCOUNTER FOR SCREENING MAMMOGRAM FOR MALIGNANT NEOPLASM OF BREAST: ICD-10-CM

## 2025-03-20 DIAGNOSIS — Z87.2 HISTORY OF PSORIASIS: ICD-10-CM

## 2025-03-20 DIAGNOSIS — Z13.6 SCREENING FOR CARDIOVASCULAR CONDITION: ICD-10-CM

## 2025-03-20 PROBLEM — S43.006A DISLOCATION OF SHOULDER JOINT: Status: RESOLVED | Noted: 2021-11-03 | Resolved: 2025-03-20

## 2025-03-20 PROBLEM — S42.209A CLOSED FRACTURE OF UPPER END OF HUMERUS: Status: RESOLVED | Noted: 2021-11-03 | Resolved: 2025-03-20

## 2025-03-20 PROBLEM — M75.120 FULL THICKNESS ROTATOR CUFF TEAR: Status: RESOLVED | Noted: 2021-11-03 | Resolved: 2025-03-20

## 2025-03-20 PROCEDURE — G0402 INITIAL PREVENTIVE EXAM: HCPCS | Performed by: FAMILY MEDICINE

## 2025-03-20 PROCEDURE — G0009 ADMIN PNEUMOCOCCAL VACCINE: HCPCS | Performed by: FAMILY MEDICINE

## 2025-03-20 PROCEDURE — 3074F SYST BP LT 130 MM HG: CPT | Performed by: FAMILY MEDICINE

## 2025-03-20 PROCEDURE — 3078F DIAST BP <80 MM HG: CPT | Performed by: FAMILY MEDICINE

## 2025-03-20 PROCEDURE — 90677 PCV20 VACCINE IM: CPT | Performed by: FAMILY MEDICINE

## 2025-03-20 RX ORDER — NYSTATIN 100000 [USP'U]/G
1 POWDER TOPICAL 3 TIMES DAILY
Qty: 30 G | Refills: 3 | Status: SHIPPED | OUTPATIENT
Start: 2025-03-20 | End: 2025-03-24 | Stop reason: SDUPTHER

## 2025-03-20 ASSESSMENT — PATIENT HEALTH QUESTIONNAIRE - PHQ9: CLINICAL INTERPRETATION OF PHQ2 SCORE: 0

## 2025-03-20 ASSESSMENT — ENCOUNTER SYMPTOMS: GENERAL WELL-BEING: FAIR

## 2025-03-20 ASSESSMENT — ACTIVITIES OF DAILY LIVING (ADL): BATHING_REQUIRES_ASSISTANCE: 0

## 2025-03-20 ASSESSMENT — FIBROSIS 4 INDEX: FIB4 SCORE: 1.58

## 2025-03-20 NOTE — PROGRESS NOTES
Chief Complaint   Patient presents with    Medicare Annual Wellness     screening       HPI:  Magdalena Ascencio is a 69 y.o. here for Medicare Annual Wellness Visit   Specialists: no specialists  Routine dental and eye exams.   Routine use of seatbelt/sunscreen/home monitors reviewed.   Has not seen provider since 2022.    Under breast area intertrigo- bumps on skin. Nystatin helps. Needs refill.   Right side nose pink patch, some patchiness on face. Hx of psoriasis. Past few months, notes lesions on face. Notes hx of psoriasis.   20 years, 1 ppd. No clear plan for quitting at this time.     Patient Active Problem List    Diagnosis Date Noted    Closed fracture of upper end of humerus 11/03/2021    Dislocation of shoulder joint 11/03/2021    Full thickness rotator cuff tear 11/03/2021    Marijuana use 02/27/2020    BMI 40.0-44.9, adult ([facility]) 03/25/2016    Psoriasis 03/25/2016       Current Outpatient Medications   Medication Sig Dispense Refill    nystatin (MYCOSTATIN) powder Apply 1 g topically 3 times a day. 30 g 3     No current facility-administered medications for this visit.          Current supplements as per medication list.     Allergies: Sulfa drugs    Current social contact/activities:  pt report like to read and cards     She  reports that she has been smoking cigarettes. She started smoking about 29 years ago. She has a 20 pack-year smoking history. She has never used smokeless tobacco. She reports current alcohol use of about 1.8 oz of alcohol per week. She reports current drug use. Frequency: 4.00 times per week. Drugs: Marijuana and Inhaled.  Ready to quit: Not Answered  Counseling given: Not Answered      ROS:    Gait: Uses no assistive device  Ostomy: No  Other tubes: No  Amputations: No  Chronic oxygen use: No  Last eye exam: 3/2025  Wears hearing aids: No   : Reports urinary leakage during the last 6 months that has somewhat interfered with their daily activities or  sleep.    Screening:  Reviewed     Depression Screening  Little interest or pleasure in doing things?  0 - not at all  Feeling down, depressed , or hopeless? 0 - not at all  Patient Health Questionnaire Score: 0     If depressive symptoms identified deferred to follow up visit unless specifically addressed in assessment and plan.    Interpretation of PHQ-9 Total Score   Score Severity   1-4 No Depression   5-9 Mild Depression   10-14 Moderate Depression   15-19 Moderately Severe Depression   20-27 Severe Depression    Screening for Cognitive Impairment  Do you or any of your friends or family members have any concern about your memory? No  Three Minute Recall (Village, Kitchen, Baby) 3/3    Grant clock face with all 12 numbers and set the hands to show 10 minutes past 11.  Yes    Cognitive concerns identified deferred for follow up unless specifically addressed in assessment and plan.    Fall Risk Assessment  Has the patient had two or more falls in the last year or any fall with injury in the last year?  No    Safety Assessment  Do you always wear your seatbelt?  Yes  Any changes to home needed to function safely? No  Difficulty hearing.  No  Patient counseled about all safety risks that were identified.    Functional Assessment ADLs  Are there any barriers preventing you from cooking for yourself or meeting nutritional needs?  No.    Are there any barriers preventing you from driving safely or obtaining transportation?  Yes. 4 mos ago stop driving because of rt eye .vision    more reach is being done and going to [place] for genetic testing   Are there any barriers preventing you from using a telephone or calling for help?  No    Are there any barriers preventing you from shopping?  No.    Are there any barriers preventing you from taking care of your own finances?  No    Are there any barriers preventing you from managing your medications?  No    Are there any barriers preventing you from showering, bathing  or dressing yourself? No    Are there any barriers preventing you from doing housework or laundry? No  Are there any barriers preventing you from using the toilet?No  Are you currently engaging in any exercise or physical activity?  Yes. 3 to 4 times a week,  walking and stretching     Self-Assessment of Health  What is your perception of your health? Fair    Do you sleep more than six hours a night? No 4 hours generally   In the past 7 days, how much did pain keep you from doing your normal work? None    Do you spend quality time with family or friends (virtually or in person)? Yes both  Do you usually eat a heart healthy diet that constists of a variety of fruits, vegetables, whole grains and fiber? No Half and half , pt working on it  Do you eat foods high in fat and/or Fast Food more than three times per week? No    How concerned are you that your medical conditions are not being well managed? extremely If talking about her rt eye , then no lots of concerns. No final diagnostic   Are you worried that in the next 2 months, you may not have stable housing that you own, rent, or stay in as part of a household? No      Advance Care Planning  Do you have an Advance Directive, Living Will, Durable Power of , or POLST? Yes  Advance Directive Living Will Durable Power of  POLST is not on file - instructed patient to bring in a copy to scan into their chart      Health Maintenance Summary            Current Care Gaps       Influenza Vaccine (1) Overdue since 9/1/2024      11/10/2022  Imm Admin: Influenza Vaccine Adult HD    10/08/2021  Imm Admin: Influenza Vaccine Quad Inj (Pf)    10/28/2020  Imm Admin: Influenza Vaccine Quad Inj (Pf)    11/05/2015  Imm Admin: Influenza Seasonal Injectable - Historical Data    12/06/2013  Imm Admin: Influenza split virus trivalent (PF)     Only the first 5 history entries have been loaded, but more history exists.            COVID-19 Vaccine (4 - 2024-25 season) Overdue  since 9/1/2024 12/03/2021  Imm Admin: MODERNA SARS-COV-2 VACCINE (12+)    03/04/2021  Imm Admin: MODERNA SARS-COV-2 VACCINE (12+)    02/04/2021  Imm Admin: MODERNA SARS-COV-2 VACCINE (12+)              IMM DTaP/Tdap/Td Vaccine (1 - Tdap) Never done      No completion history exists for this topic.              Zoster (Shingles) Vaccines (1 of 2) Never done     No completion history exists for this topic.                      Awaiting Completion       Bone Density Scan (Every 5 Years) Order placed this encounter      03/20/2025  Order placed for DS-BONE DENSITY STUDY (DEXA) by Eileen Reno M.D.              Hepatitis C Screening (Once) Order placed this encounter     No completion history exists for this topic.              Pneumococcal Vaccine: 50+ Years (1 of 2 - PCV) Order placed this encounter      03/20/2025  Order placed for Pneumococcal Conjugate Vaccine 20-Valent (6 wks+) by Eileen Reno M.D.              Colorectal Cancer Screening (View Topic Details) Order placed this encounter      03/20/2025  Order placed for Referral to GI for Colonoscopy by Eileen Reno M.D.              Mammogram (Yearly) Order placed this encounter      03/20/2025  Order placed for MA-SCREENING MAMMO BILAT W/TOMOSYNTHESIS W/CAD by Eileen Reno M.D.    01/01/2006  Done - NL              Lung Cancer Screening Shared Decision Making (Once) Order placed this encounter      03/20/2025  Order placed for REFERRAL TO LUNG CANCER SCREENING PROGRAM by Eileen Reno M.D.                      Upcoming       Annual Wellness Visit (Yearly) Next due on 3/20/2026      03/20/2025  Visit Dx: Medicare annual wellness visit, initial    11/10/2022  Level of Service: DE PREVENTIVE VISIT,EST,65 & OVER                      Completed or No Longer Recommended       Hepatitis B Vaccine (Hep B) (Series Information) Completed      07/08/1999  Imm Admin: Hepatitis B Vaccine (Adol/Adult)    03/04/1999  Imm Admin: Hepatitis B Vaccine (Adol/Adult)     1999  Imm Admin: Hepatitis B Vaccine (Adol/Adult)              Hepatitis A Vaccine (Hep A) (Series Information) Aged Out      1999  Imm Admin: Hepatitis A Vaccine, Adult    1999  Imm Admin: Hepatitis A Vaccine, Adult              HPV Vaccines (Series Information) Aged Out     No completion history exists for this topic.              Polio Vaccine (Inactivated Polio) (Series Information) Aged Out     No completion history exists for this topic.              Meningococcal Immunization (Series Information) Aged Out     No completion history exists for this topic.              Cervical Cancer Screening  Discontinued        Frequency changed to Never automatically (Topic No Longer Applies)    2006  Done - NL                            Patient Care Team:  Eileen Reno M.D. as PCP - General (Family Medicine)  ARACELI Owen, MSW as  (Social Work)        Social History     Tobacco Use    Smoking status: Every Day     Current packs/day: 0.00     Average packs/day: 1 pack/day for 20.0 years (20.0 ttl pk-yrs)     Types: Cigarettes     Start date: 3/1/1996     Last attempt to quit: 3/1/2016     Years since quittin.0    Smokeless tobacco: Never   Vaping Use    Vaping status: Never Used   Substance Use Topics    Alcohol use: Yes     Alcohol/week: 1.8 oz     Types: 3 Standard drinks or equivalent per week     Comment: weekly     Drug use: Yes     Frequency: 4.0 times per week     Types: Marijuana, Inhaled     Comment: marijuana last used 10/27     Family History   Problem Relation Age of Onset    Dementia Mother     Cancer Father         brain Ca    Heart Attack Brother 45     She  has a past medical history of Blood transfusion without reported diagnosis, Hypertension, IBD (inflammatory bowel disease), Pain, Psychiatric problem, and Shingles.    She has no past medical history of Anxiety, Clotting disorder (HCC), COPD (chronic obstructive pulmonary disease) (HCC), Depression,  "Diabetic neuropathy (HCC), GERD (gastroesophageal reflux disease), Goiter, Hyperlipidemia, Migraine, Muscle disorder, Osteoporosis, or Substance abuse (HCC).   Past Surgical History:   Procedure Laterality Date    PB OPEN TREATMENT CLAVICULAR FRACTURE INTERNAL * Left 10/28/2021    Procedure: ORIF, FRACTURE, CLAVICLE - AND REPAIRS AS INDICATED;  Surgeon: Darryl Thomas M.D.;  Location: SURGERY [facility];  Service: Orthopedics    ORIF, FRACTURE, HUMERUS Left 10/28/2021    Procedure: ORIF, FRACTURE, HUMERUS - PROXIMAL;  Surgeon: Darryl Thomas M.D.;  Location: SURGERY [facility];  Service: Orthopedics    DILATION AND CURETTAGE  22s    TONSILLECTOMY  8 yoa       Exam:   /70 (BP Location: Left arm, Patient Position: Sitting, BP Cuff Size: Adult)   Pulse 67   Temp 36.7 °C (98.1 °F) (Temporal)   Resp 14   Ht 1.676 m (5' 6\")   Wt 94.7 kg (208 lb 12.8 oz)   SpO2 98%  Body mass index is 33.7 kg/m².    Hearing good.    Dentition good  Alert, oriented in no acute distress.  Eye contact is good, speech goal directed, affect calm  Constitutional: She appears well-developed and well-nourished. She appears not diaphoretic. No distress.   HENT: Right Ear: External ear normal. Left Ear: External ear normal. Tympanic membranes clear and intact.   Nose: Nose normal.   Mouth/Throat: Oropharynx is clear and moist. No oropharyngeal exudate.     Eyes: Conjunctivae and extraocular motions are normal. Pupils are equal, round, and reactive to light. No scleral icterus.   Neck: Normal range of motion. Neck supple. No thyromegaly present.   Cardiovascular: Normal rate, regular rhythm, normal heart sounds and intact distal pulses.  Exam reveals no gallop and no friction rub.  No murmur heard. No carotid bruits.   Pulmonary/Chest: Effort normal and breath sounds normal. No respiratory distress. She has no wheezes. She has no rales.   Abdominal: Soft. Bowel sounds are normal. She exhibits no distension and no mass. No " tenderness. She has no rebound and no guarding.   Lymphadenopathy:  She has no cervical adenopathy.   Neurological: She is alert. She has normal reflexes. No cranial nerve deficit. She exhibits normal muscle tone.   Skin: Skin is warm and dry. She is not diaphoretic. multiple pink dry patches on face, more prominent pink dry patch on right side nose.   Psychiatric: She has a normal mood and affect. Her behavior is normal.   Musculoskeletal: She exhibits no edema. Full strength throughout. 2+ DTR throughout.       Assessment and Plan. The following treatment and monitoring plan is recommended:      70 yo female    1. Medicare annual wellness visit, initial        2. Smoking   Reviewed recommendations for smoking cessation. Monitor.  CBC WITH DIFFERENTIAL    Comp Metabolic Panel    REFERRAL TO LUNG CANCER SCREENING PROGRAM      3. Postmenopausal  TSH WITH REFLEX TO FT4    DS-BONE DENSITY STUDY (DEXA)      4. Elevated glucose level   Monitor labs.  Comp Metabolic Panel, TSH    HEMOGLOBIN A1C      5. Elevated ALT measurement   Monitor.  TSH WITH REFLEX TO FT4, CMP      6. Screening for cardiovascular condition  Lipid Profile      7. Need for hepatitis C screening test  HEP C VIRUS ANTIBODY      8. Screen for colon cancer  Referral to GI for Colonoscopy      9. Encounter for screening mammogram for malignant neoplasm of breast  MA-SCREENING MAMMO BILAT W/TOMOSYNTHESIS W/CAD      10. Intertrigo -stable, medication refilled.  nystatin (MYCOSTATIN) powder      11. Skin lesion of face - right side nose lesion, needs further evaluation. Referral to dermatology.  Referral to Dermatology      12. History of psoriasis        13. Skin cancer screening  Referral to Dermatology      14. Need for pneumococcal vaccination  Pneumococcal Conjugate Vaccine 20-Valent (6 wks+)      Reviewed immunization recommendations as above, some may be completed at pharmacy.     Services suggested: No services needed at this time  Health Care  Screening: Age-appropriate preventive services recommended by USPTF and ACIP covered by Medicare were discussed today. Services ordered if indicated and agreed upon by the patient.  Referrals offered: Community-based lifestyle interventions to reduce health risks and promote self-management and wellness, fall prevention, nutrition, physical activity, tobacco-use cessation, weight loss, and mental health services as per orders if indicated.    Discussion today about general wellness and lifestyle habits:    Prevent falls and reduce trip hazards; Cautioned about securing or removing rugs.  Have a working fire alarm and carbon monoxide detector;   Engage in regular physical activity and social activities     Follow-up: Return in about 3 months (around 6/20/2025).     Recommend routine AWV.     This medical record contains text that has been entered with the assistance of computer voice recognition and dictation software.  Therefore, it may contain unintended errors in text, spelling, punctuation, or grammar.

## 2025-03-24 DIAGNOSIS — L30.4 INTERTRIGO: ICD-10-CM

## 2025-03-24 NOTE — TELEPHONE ENCOUNTER
Received request via: Pharmacy    Was the patient seen in the last year in this department? Yes    Does the patient have an active prescription (recently filled or refills available) for medication(s) requested? No    Pharmacy Name: [facility]    Does the patient have correction Plus and need 100-day supply? (This applies to ALL medications) insurance requires a minimum of 60 days for mail order prescriptions and authorized to dispense a 3 mos supply

## 2025-03-25 ENCOUNTER — TELEPHONE (OUTPATIENT)
Dept: HEALTH INFORMATION MANAGEMENT | Facility: OTHER | Age: 70
End: 2025-03-25

## 2025-03-25 RX ORDER — NYSTATIN 100000 [USP'U]/G
1 POWDER TOPICAL 3 TIMES DAILY
Qty: 30 G | Refills: 3 | Status: SHIPPED | OUTPATIENT
Start: 2025-03-25

## 2025-04-01 DIAGNOSIS — L30.4 INTERTRIGO: ICD-10-CM

## 2025-04-01 RX ORDER — NYSTATIN 100000 [USP'U]/G
1 POWDER TOPICAL 3 TIMES DAILY
Qty: 100 G | Refills: 0 | Status: CANCELLED | OUTPATIENT
Start: 2025-04-01

## 2025-04-01 NOTE — TELEPHONE ENCOUNTER
Received request via: Patient    Was the patient seen in the last year in this department? Yes    Does the patient have an active prescription (recently filled or refills available) for medication(s) requested? No    Pharmacy Name: [facility] #[id number]    Does the patient have care home Plus and need 100-day supply? (This applies to ALL medications) Yes, quantity updated to 100 days

## 2025-04-16 NOTE — PROGRESS NOTES
Outreach call to mbr ot follow up on travel to [place] for her appt on 04/11/25 and determine if she has been able to complete travel form. No answer, LSW left VM requesting call back.

## 2025-04-18 DIAGNOSIS — L30.4 INTERTRIGO: ICD-10-CM

## 2025-04-18 NOTE — TELEPHONE ENCOUNTER
Received request via: Patient    Was the patient seen in the last year in this department? Yes    Does the patient have an active prescription (recently filled or refills available) for medication(s) requested? Yes. Pt is requesting this RX go to a different pharmacy     Pharmacy Name: [facility] #[id number] [address]     Does the patient have alf Plus and need 100-day supply? (This applies to ALL medications) Yes, quantity updated to 100 days

## 2025-04-21 RX ORDER — NYSTATIN 100000 [USP'U]/G
1 POWDER TOPICAL 3 TIMES DAILY
Qty: 30 G | Refills: 3 | Status: SHIPPED | OUTPATIENT
Start: 2025-04-21

## 2025-05-06 ENCOUNTER — OFFICE VISIT (OUTPATIENT)
Dept: DERMATOLOGY | Facility: IMAGING CENTER | Age: 70
End: 2025-05-06
Payer: MEDICARE

## 2025-05-06 DIAGNOSIS — L30.9 DERMATITIS: ICD-10-CM

## 2025-05-06 DIAGNOSIS — L57.0 ACTINIC KERATOSIS: ICD-10-CM

## 2025-05-06 DIAGNOSIS — L82.1 SK (SEBORRHEIC KERATOSIS): ICD-10-CM

## 2025-05-06 PROCEDURE — 99204 OFFICE O/P NEW MOD 45 MIN: CPT | Mod: 25 | Performed by: NURSE PRACTITIONER

## 2025-05-06 PROCEDURE — 17000 DESTRUCT PREMALG LESION: CPT | Performed by: NURSE PRACTITIONER

## 2025-05-06 PROCEDURE — 17003 DESTRUCT PREMALG LES 2-14: CPT | Performed by: NURSE PRACTITIONER

## 2025-05-06 RX ORDER — TACROLIMUS 1 MG/G
OINTMENT TOPICAL
Qty: 30 G | Refills: 1 | Status: SHIPPED | OUTPATIENT
Start: 2025-05-06

## 2025-05-06 RX ORDER — TRIAMCINOLONE ACETONIDE 1 MG/G
CREAM TOPICAL
Qty: 45 G | Refills: 1 | Status: SHIPPED | OUTPATIENT
Start: 2025-05-06

## 2025-05-06 NOTE — PROGRESS NOTES
"DERMATOLOGY NOTE  NEW VISIT       Chief complaint: Establish Care (PSO )        New patient here for PSO on face, also has a cyst on mid back. Was previously excised about 10-15 years ago         Allergies   Allergen Reactions    Sulfa Drugs Rash     Minor rash        MEDICATIONS:  Medications relevant to specialty reviewed.     REVIEW OF SYSTEMS:   Positive for skin (see HPI)  Negative for fevers and chills       EXAM:  There were no vitals taken for this visit.  Constitutional: Well-developed, well-nourished, and in no distress.     A focused skin exam was performed including the affected areas of the face and back. Notable findings on exam today listed below and/or in assessment/plan.     Ill-defined erythematous gritty/scaly papules over the forehead, R temple, bilateral cheeks  Tan and light brown waxy stuck on appearing papules on R infraorbital region and L nasal sidewall  Localized morbiliform rash to extremities     IMPRESSION / PLAN:    1. Dermatitis, AD (favored) vs PSO  Discussed course/nature of both  Will trial Rx's below  Close follow up 8 weeks  - triamcinolone acetonide (KENALOG) 0.1 % Cream; AAA twice a day for 2 weeks at a time with 1-2 week break in between  Dispense: 45 g; Refill: 1  - tacrolimus (PROTOPIC) 0.1 % Ointment; AAA, face,  twice a day as needed  Dispense: 30 g; Refill: 1    2. Actinic keratosis  - NMSC/\"pre-cancer\" education/counseling   CRYOTHERAPY:  Risks (including, but not limited to: skin discoloration, redness, blister, blood blister, recurrence, need for further treatment, infection, scar) and benefits of cryotherapy discussed. Patient verbally agreed to proceed with treatment. 1 cryotherapy freeze thaw cycles of 10 seconds were applied to 6 lesions on face as noted on exam with cryac. Patient tolerated procedure well. Aftercare instructions given--no specific care needed unless irritated during healing process, can apply Vaseline with small band-aid if needed.      3. SK " (seborrheic keratosis)  - Benign-appearing nature of lesions discussed during exam.   - courtesy LN2 applied to 2 SKs on face for cosmesis  - Advised to continue to monitor for any return to clinic for new or concerning changes.        Discussed risks associated with LN2, Patient verbalized understanding and agrees with plan regarding the above          Please note that this dictation was created using voice recognition software. I have made every reasonable attempt to correct obvious errors, but I expect that there are errors of grammar and possibly content that I did not discover before finalizing the note.      Return to clinic in: Return in about 2 months (around 7/6/2025) for Rash follow up. and as needed for any new or changing skin lesions.

## 2025-05-13 ENCOUNTER — HOSPITAL ENCOUNTER (OUTPATIENT)
Dept: LAB | Facility: MEDICAL CENTER | Age: 70
End: 2025-05-13
Attending: OPHTHALMOLOGY
Payer: MEDICARE

## 2025-05-13 LAB — CRP SERPL HS-MCNC: 0.39 MG/DL (ref 0–0.75)

## 2025-05-13 PROCEDURE — 36415 COLL VENOUS BLD VENIPUNCTURE: CPT

## 2025-05-13 PROCEDURE — 86140 C-REACTIVE PROTEIN: CPT

## 2025-05-13 PROCEDURE — 85652 RBC SED RATE AUTOMATED: CPT

## 2025-05-14 LAB — ERYTHROCYTE [SEDIMENTATION RATE] IN BLOOD BY WESTERGREN METHOD: 5 MM/HOUR (ref 0–25)

## 2025-07-01 ENCOUNTER — APPOINTMENT (OUTPATIENT)
Dept: DERMATOLOGY | Facility: IMAGING CENTER | Age: 70
End: 2025-07-01
Payer: MEDICARE

## 2025-07-01 DIAGNOSIS — L72.0 EPIDERMAL CYST: ICD-10-CM

## 2025-07-01 DIAGNOSIS — L30.9 DERMATITIS: Primary | ICD-10-CM

## 2025-07-01 PROCEDURE — 99212 OFFICE O/P EST SF 10 MIN: CPT | Performed by: NURSE PRACTITIONER

## 2025-07-01 RX ORDER — TACROLIMUS 1 MG/G
OINTMENT TOPICAL
Qty: 30 G | Refills: 1 | Status: SHIPPED | OUTPATIENT
Start: 2025-07-01

## 2025-07-01 NOTE — PROGRESS NOTES
DERMATOLOGY NOTE  FOLLOW UP VISIT       Chief complaint: Rash ( fv)      HPI: Patient returns for follow up for management of dermatitis that is improving. Pt reports using cream on face and not oint, so might have not been using TAC and TCS correctly, only applying on face    CYST on back - not looked at last visit per pt       New patient here for PSO on face, also has a cyst on mid back. Was previously excised about 10-15 years ago    Allergies   Allergen Reactions    Sulfa Drugs Rash     Minor rash        MEDICATIONS:  Medications relevant to specialty reviewed.     REVIEW OF SYSTEMS:   Positive for skin (see HPI)  Negative for fevers and chills       EXAM:  There were no vitals taken for this visit.  Constitutional: Well-developed, well-nourished, and in no distress.     A focused skin exam was performed including the affected areas of the face and back. Notable findings on exam today listed below and/or in assessment/plan.       Localized morbiliform papules and patches  to extremities, in various stages of healing with PIH  Approx. 1.2 cm cystic structure to mid upper back  IMPRESSION / PLAN:    1. Dermatitis, AD (favored) vs PSO (low suspicion)--moderately improved  Previously discussed course/nature of both  Has TAC and triamcinolone on hand, advised to use as prescribed, pt needs refill on tacrolimus  Pt understands importance of emollient use      2. Epidermal cyst  Bothersome, getting bigger, pruritic  Pt would like removed  PA form completed           Patient verbalized understanding and agrees with plan regarding the above          Please note that this dictation was created using voice recognition software. I have made every reasonable attempt to correct obvious errors, but I expect that there are errors of grammar and possibly content that I did not discover before finalizing the note.      Return to clinic in: Return for pending PA for cyst excision. and as needed for any new or changing skin  lesions.

## 2025-07-03 ENCOUNTER — APPOINTMENT (OUTPATIENT)
Dept: MEDICAL GROUP | Facility: IMAGING CENTER | Age: 70
End: 2025-07-03
Payer: MEDICARE

## 2025-07-03 VITALS
DIASTOLIC BLOOD PRESSURE: 64 MMHG | OXYGEN SATURATION: 97 % | TEMPERATURE: 97.1 F | HEIGHT: 66 IN | BODY MASS INDEX: 33.4 KG/M2 | WEIGHT: 207.8 LBS | SYSTOLIC BLOOD PRESSURE: 118 MMHG | HEART RATE: 95 BPM | RESPIRATION RATE: 15 BRPM

## 2025-07-03 DIAGNOSIS — F17.200 SMOKER: ICD-10-CM

## 2025-07-03 DIAGNOSIS — Z13.6 SCREENING FOR CARDIOVASCULAR CONDITION: ICD-10-CM

## 2025-07-03 DIAGNOSIS — R06.83 SNORING: ICD-10-CM

## 2025-07-03 DIAGNOSIS — L40.9 PSORIASIS: ICD-10-CM

## 2025-07-03 DIAGNOSIS — R73.09 ELEVATED GLUCOSE LEVEL: ICD-10-CM

## 2025-07-03 DIAGNOSIS — R26.89 BALANCE PROBLEM: ICD-10-CM

## 2025-07-03 DIAGNOSIS — H54.3 VISION LOSS, BILATERAL: ICD-10-CM

## 2025-07-03 DIAGNOSIS — F43.29 STRESS AND ADJUSTMENT REACTION: ICD-10-CM

## 2025-07-03 DIAGNOSIS — R74.01 ELEVATED ALT MEASUREMENT: ICD-10-CM

## 2025-07-03 DIAGNOSIS — Z11.59 NEED FOR HEPATITIS C SCREENING TEST: ICD-10-CM

## 2025-07-03 PROCEDURE — 3074F SYST BP LT 130 MM HG: CPT | Performed by: FAMILY MEDICINE

## 2025-07-03 PROCEDURE — 3078F DIAST BP <80 MM HG: CPT | Performed by: FAMILY MEDICINE

## 2025-07-03 PROCEDURE — 99214 OFFICE O/P EST MOD 30 MIN: CPT | Performed by: FAMILY MEDICINE

## 2025-07-03 ASSESSMENT — FIBROSIS 4 INDEX: FIB4 SCORE: 1.58

## 2025-07-03 NOTE — PROGRESS NOTES
"  SUBJECTIVE:    Chief Complaint   Patient presents with    Follow-Up     Well visit     Other     Vision issues , record scan from [facility]- vision encounter 6/11/25  Balance issues   Both feet sleeping and depth perception        HPI:     Magdalena Ascencio is a 69 y.o. female here for review of medical issues with vision. Seeing [facility]. Uncertain etiology.   Went to bed, then woke up with half of vision, 8 months ago.   Had MRI- no stroke, no brain bleed, no other findings.   Her focus, left eye is worse.   Left eye was healing, previously gray, one day woke up and saw color and shapes again.   Right eye with some blind spots. Has a \"crack\" so unable to see faces. If goes to focus, loses her vision.   On going genetic testing.   Now taking asa 81 mg daily.   CTA head- normal.   CTA neck - normal carotid arteries.   No holter/sleep apnea test. Snores if sleeping on back.   Feels sleep disrupted due to urination at night.   EKG done in [place] per patient since vision issues.     Does not feels she has good balance. Feet fall asleep easily.   Depth perception now off, now affecting balance more.     Smoking- not ready to quit.   Stress/when gets upset, has crutch.   Has done counseling in past.       ROS:  No recent fevers or chills. No nausea or vomiting. No diarrhea. No chest pains or shortness of breath. No lower extremity edema.    Medications Ordered Prior to Encounter[1]    Allergies[2]    Patient Active Problem List    Diagnosis Date Noted    Marijuana use 02/27/2020    BMI 40.0-44.9, adult ([facility]) 03/25/2016    Psoriasis 03/25/2016       Past Medical History[3]      OBJECTIVE:   /64 (BP Location: Left arm, Patient Position: Sitting, BP Cuff Size: Adult)   Pulse 95   Temp 36.2 °C (97.1 °F) (Temporal)   Resp 15   Ht 1.676 m (5' 6\")   Wt 94.3 kg (207 lb 12.8 oz)   SpO2 97%   BMI 33.54 kg/m²   General: Well-developed well-nourished female, no acute distress  HEENT: oropharynx clear, eyes clear, perrl, " eomi, cn 2-12 intact bilaterally  Neck: supple, no lymphadenopathy- cervical or supraclavicular, no thyromegaly  Cardiovascular: regular rate and rhythm, no murmurs, gallops, rubs  Lungs: clear to auscultation bilaterally, no wheezes, crackles, or rhonchi  Abdomen: +bowel sounds, soft, nontender, nondistended, no rebound, no guarding, no hepatosplenomegaly  Extremities: no cyanosis, clubbing, edema  Skin: Warm and dry  Psych: appropriate mood and affect  Neuro: 2+ DTR throughout, 5/5 strength throughout        ASSESSMENT/PLAN:    69 y.o.female       1. Vision loss, bilateral  Cardiac Event Monitor      2. Snoring  Cardiac Event Monitor    Referral to Pulmonary and Sleep Medicine    CBC WITH DIFFERENTIAL      3. Balance problem  Referral to Physical Therapy    CBC WITH DIFFERENTIAL    TSH WITH REFLEX TO FT4    FOLATE    VITAMIN B12      4. Smoker        5. Stress and adjustment reaction        6. Elevated ALT measurement  Comp Metabolic Panel      7. Psoriasis  VITAMIN D,25 HYDROXY (DEFICIENCY)      8. Elevated glucose level  Comp Metabolic Panel    HEMOGLOBIN A1C      9. Need for hepatitis C screening test  HEP C VIRUS ANTIBODY      10. Screening for cardiovascular condition  Lipid Profile      -Vision loss, bilateral-patient followed by ophthalmology at [facility].  Unclear etiology of vision loss.  Will plan to further assess with Holter monitor for possible irregularity of heart rhythm.  -Snoring-assess sleep test, Holter monitor and labs.  -Balance problem-will refer to physical therapy.  Assess lab work.  Fall precautions.  -Smoker-counseled on smoking cessation.  Patient currently not ready to quit.  -Stress and adjustment reaction-associated with current health changes.  Counseled on routine self-care activities.  Consider counseling therapy.  -Plan to monitor lab work.    Return in about 2 months (around 9/3/2025).    This medical record contains text that has been entered with the assistance of  computer voice recognition and dictation software.  Therefore, it may contain unintended errors in text, spelling, punctuation, or grammar.                                 [1]   Current Outpatient Medications on File Prior to Visit   Medication Sig Dispense Refill    Non Formulary Request Areds 2 eye drops daily      tacrolimus (PROTOPIC) 0.1 % Ointment AAA, face,  twice a day as needed 30 g 1    triamcinolone acetonide (KENALOG) 0.1 % Cream AAA twice a day for 2 weeks at a time with 1-2 week break in between 45 g 1    nystatin (MYCOSTATIN) powder Apply 1 g topically 3 times a day. 30 g 3     No current facility-administered medications on file prior to visit.   [2]   Allergies  Allergen Reactions    Sulfa Drugs Rash     Minor rash   [3]   Past Medical History:  Diagnosis Date    Blood transfusion without reported diagnosis     in her tonsilectomy at age 8    Closed fracture of upper end of humerus 11/03/2021    Dislocation of shoulder joint 11/03/2021    Full thickness rotator cuff tear 11/03/2021    Hypertension     in history, thought stressed realted, hx of white coat syndrome    IBD (inflammatory bowel disease)     maybe mild IBS occasionally    Pain     arm 7-10-/10    Psychiatric problem     depression in the past after death of family    Shingles

## 2025-07-10 NOTE — Clinical Note
REFERRAL APPROVAL NOTICE         Sent on July 10, 2025                   Magdalena Ascencio  [address] [place] [address]                   Dear Ms. Ascencio,    After a careful review of the medical information and benefit coverage, [facility] has processed your referral. See below for additional details.    If applicable, you must be actively enrolled with your insurance for coverage of the authorized service. If you have any questions regarding your coverage, please contact your insurance directly.    REFERRAL INFORMATION   Referral #:  [id number]  Referred-To Provider    Referred-By Provider:  Sleep Medicine    Eileen Reno M.D.   [facility]      [address] [place] [address]  [phone] [id number]  [place] [address]  [phone]    Referral Start Date:  07/03/2025  Referral End Date:   07/03/2026             SCHEDULING  If you do not already have an appointment, please call [phone] to make an appointment.     MORE INFORMATION  If you do not already have a [employer] account, sign up at: [email or web address]  You can access your medical information, make appointments, see lab results, billing information, and more.  If you have questions regarding this referral, please contact  the [facility] Referrals department at:             [phone]. Monday - Friday 8:00AM - 5:00PM.     Sincerely,    [facility]

## 2025-07-17 ENCOUNTER — TELEPHONE (OUTPATIENT)
Dept: HEALTH INFORMATION MANAGEMENT | Facility: OTHER | Age: 70
End: 2025-07-17
Payer: MEDICARE

## 2025-07-24 ENCOUNTER — TELEPHONE (OUTPATIENT)
Dept: HEALTH INFORMATION MANAGEMENT | Facility: OTHER | Age: 70
End: 2025-07-24
Payer: MEDICARE

## 2025-08-05 ENCOUNTER — GYNECOLOGY VISIT (OUTPATIENT)
Dept: DERMATOLOGY | Facility: IMAGING CENTER | Age: 70
End: 2025-08-05
Payer: MEDICARE

## 2025-08-05 DIAGNOSIS — R20.9 DISTURBANCE OF SKIN SENSATION: ICD-10-CM

## 2025-08-05 DIAGNOSIS — L72.0 EPIDERMAL CYST: ICD-10-CM

## 2025-08-05 DIAGNOSIS — Z79.2 PROPHYLACTIC ANTIBIOTIC: Primary | ICD-10-CM

## 2025-08-05 PROCEDURE — 12031 INTMD RPR S/A/T/EXT 2.5 CM/<: CPT | Performed by: NURSE PRACTITIONER

## 2025-08-05 PROCEDURE — 11402 EXC TR-EXT B9+MARG 1.1-2 CM: CPT | Performed by: NURSE PRACTITIONER

## 2025-08-05 RX ORDER — DOXYCYCLINE HYCLATE 100 MG
100 TABLET ORAL 2 TIMES DAILY
Qty: 14 TABLET | Refills: 0 | Status: SHIPPED | OUTPATIENT
Start: 2025-08-05

## 2025-08-19 ENCOUNTER — NON-PROVIDER VISIT (OUTPATIENT)
Dept: DERMATOLOGY | Facility: IMAGING CENTER | Age: 70
End: 2025-08-19
Payer: MEDICARE

## 2025-08-21 ENCOUNTER — NON-PROVIDER VISIT (OUTPATIENT)
Dept: DERMATOLOGY | Facility: IMAGING CENTER | Age: 70
End: 2025-08-21
Payer: MEDICARE

## 2025-08-25 ENCOUNTER — NON-PROVIDER VISIT (OUTPATIENT)
Dept: DERMATOLOGY | Facility: IMAGING CENTER | Age: 70
End: 2025-08-25
Payer: MEDICARE

## (undated) DEVICE — SET LEADWIRE 5 LEAD BEDSIDE DISPOSABLE ECG (1SET OF 5/EA)

## (undated) DEVICE — SUTURE 0 VICRYL PLUS CT-1 - 36 INCH (36/BX)

## (undated) DEVICE — SUTURE 1 VICRYL PLUS CTX - 36 INCH (36/BX)

## (undated) DEVICE — PACK MAJOR ORTHO - (2EA/CA)

## (undated) DEVICE — STAPLER SKIN DISP - (6/BX 10BX/CA) VISISTAT

## (undated) DEVICE — GLOVE BIOGEL INDICATOR SZ 7.5 SURGICAL PF LTX - (50PR/BX 4BX/CA)

## (undated) DEVICE — SODIUM CHL IRRIGATION 0.9% 1000ML (12EA/CA)

## (undated) DEVICE — SUTURE 2-0 VICRYL PLUS CT-1 36 (36PK/BX)"

## (undated) DEVICE — SLING ORTH UNV TIETX VLFM ARM

## (undated) DEVICE — PROTECTOR ULNA NERVE - (36PR/CA)

## (undated) DEVICE — WRAP COBAN SELF-ADHERENT 6 IN X  5YDS STERILE TAN (12/CA)

## (undated) DEVICE — NEPTUNE 4 PORT MANIFOLD - (20/PK)

## (undated) DEVICE — SUTURE GENERAL

## (undated) DEVICE — ELECTRODE DUAL RETURN W/ CORD - (50/PK)

## (undated) DEVICE — GLOVE SZ 7.5 BIOGEL PI MICRO - PF LF (50PR/BX)

## (undated) DEVICE — LACTATED RINGERS INJ 1000 ML - (14EA/CA 60CA/PF)

## (undated) DEVICE — DRAPE LARGE 3 QUARTER - (20/CA)

## (undated) DEVICE — DRESSING AQUACEL AG ADVANTAGE 3.5 X 10" (10EA/BX)"

## (undated) DEVICE — CANISTER SUCTION RIGID RED 1500CC (40EA/CA)

## (undated) DEVICE — BAG SPONGE COUNT 10.25 X 32 - BLUE (250/CA)

## (undated) DEVICE — DRAPE 36X28IN RAD CARM BND BG - (25/CA) O

## (undated) DEVICE — HEAD HOLDER JUNIOR/ADULT

## (undated) DEVICE — CHLORAPREP 26 ML APPLICATOR - ORANGE TINT(25/CA)

## (undated) DEVICE — TOWELS CLOTH SURGICAL - (4/PK 20PK/CA)

## (undated) DEVICE — TUBING CLEARLINK DUO-VENT - C-FLO (48EA/CA)

## (undated) DEVICE — DRAPE SURG STERI-DRAPE 7X11OD - (40EA/CA)

## (undated) DEVICE — KIT ANESTHESIA W/CIRCUIT & 3/LT BAG W/FILTER (20EA/CA)

## (undated) DEVICE — DRESSING TRANSPARENT FILM TEGADERM 4 X 4.75" (50EA/BX)"

## (undated) DEVICE — GLOVE BIOGEL SZ 7 SURGICAL PF LTX - (50PR/BX 4BX/CA)

## (undated) DEVICE — TUBE CONNECT SUCTION CLEAR 120 X 1/4" (50EA/CA)"

## (undated) DEVICE — NEEDLE NON SAFETY HYPO 22 GA X 1 1/2 IN (100/BX)

## (undated) DEVICE — GLOVE BIOGEL PI ORTHO SZ 7.5 PF LF (40PR/BX)

## (undated) DEVICE — CANISTER SUCTION 3000ML MECHANICAL FILTER AUTO SHUTOFF MEDI-VAC NONSTERILE LF DISP  (40EA/CA)

## (undated) DEVICE — SUTURE 3-0 MONOCRYL PLUS PS-2 - (12/BX)

## (undated) DEVICE — SYRINGE 30 ML LL (56/BX)

## (undated) DEVICE — DRAPE STRLE REG TOWEL 18X24 - (10/BX 4BX/CA)"

## (undated) DEVICE — SUTURE 3-0 VICRYL PLUS RB-1 - (36/BX)

## (undated) DEVICE — PAD LAP STERILE 18 X 18 - (5/PK 40PK/CA)

## (undated) DEVICE — DRAPE IOBAN II INCISE 23X17 - (10EA/BX 4BX/CA)

## (undated) DEVICE — BLADE SURGICAL #15 - (50/BX 3BX/CA)

## (undated) DEVICE — SCREW LOCK 3.5X18MM ST T15 - (4SFLX6+LPPELVX4=28): Type: IMPLANTABLE DEVICE | Site: CLAVICLE | Status: NON-FUNCTIONAL

## (undated) DEVICE — SLEEVE, VASO, THIGH, MED

## (undated) DEVICE — MASK ANESTHESIA ADULT  - (100/CA)

## (undated) DEVICE — SUCTION INSTRUMENT YANKAUER BULBOUS TIP W/O VENT (50EA/CA)

## (undated) DEVICE — GOWN WARMING STANDARD FLEX - (30/CA)

## (undated) DEVICE — PENCIL ELECTSURG 10FT BTN SWH - (50/CA)

## (undated) DEVICE — SET EXTENSION WITH 2 PORTS (48EA/CA) ***PART #2C8610 IS A SUBSTITUTE*****

## (undated) DEVICE — ELECTRODE 850 FOAM ADHESIVE - HYDROGEL RADIOTRNSPRNT (50/PK)

## (undated) DEVICE — SENSOR SPO2 NEO LNCS ADHESIVE (20/BX) SEE USER NOTES

## (undated) DEVICE — TOWEL STOP TIMEOUT SAFETY FLAG (40EA/CA)

## (undated) DEVICE — TUBE CONNECTING SUCTION - CLEAR PLASTIC STERILE 72 IN (50EA/CA)

## (undated) DEVICE — SUCTION INSTRUMENT YANKAUER OPEN TIP W/O VENT (50EA/CA)

## (undated) DEVICE — MAT PATIENT POSITIONING PREVALON (10EA/CA)

## (undated) DEVICE — FIBERWIRE 2.0 (12EA/BX)

## (undated) DEVICE — STOCKINET TUBULAR 6IN STERILE - 6 X 48YDS (25/CA)